# Patient Record
Sex: MALE | Race: WHITE | NOT HISPANIC OR LATINO | Employment: FULL TIME | URBAN - METROPOLITAN AREA
[De-identification: names, ages, dates, MRNs, and addresses within clinical notes are randomized per-mention and may not be internally consistent; named-entity substitution may affect disease eponyms.]

---

## 2018-06-20 ENCOUNTER — OFFICE VISIT (OUTPATIENT)
Dept: FAMILY MEDICINE | Facility: CLINIC | Age: 43
End: 2018-06-20

## 2018-06-20 VITALS
DIASTOLIC BLOOD PRESSURE: 82 MMHG | TEMPERATURE: 98.6 F | BODY MASS INDEX: 23.06 KG/M2 | OXYGEN SATURATION: 97 % | SYSTOLIC BLOOD PRESSURE: 124 MMHG | HEART RATE: 66 BPM | WEIGHT: 189.4 LBS | HEIGHT: 76 IN | RESPIRATION RATE: 16 BRPM

## 2018-06-20 DIAGNOSIS — F41.9 ANXIETY: ICD-10-CM

## 2018-06-20 DIAGNOSIS — Z23 NEED FOR VACCINATION: ICD-10-CM

## 2018-06-20 DIAGNOSIS — Z81.8 FAMILY HISTORY OF ANXIETY DISORDER: ICD-10-CM

## 2018-06-20 DIAGNOSIS — Z13.220 LIPID SCREENING: ICD-10-CM

## 2018-06-20 DIAGNOSIS — Z13.1 SCREENING FOR DIABETES MELLITUS: ICD-10-CM

## 2018-06-20 DIAGNOSIS — Z00.00 ROUTINE GENERAL MEDICAL EXAMINATION AT A HEALTH CARE FACILITY: Primary | ICD-10-CM

## 2018-06-20 DIAGNOSIS — Z87.442 HX OF RENAL CALCULI: ICD-10-CM

## 2018-06-20 DIAGNOSIS — G89.29 CHRONIC RIGHT SHOULDER PAIN: ICD-10-CM

## 2018-06-20 DIAGNOSIS — Z83.49 FAMILY HISTORY OF THYROID DISEASE IN MOTHER: ICD-10-CM

## 2018-06-20 DIAGNOSIS — Z23 NEED FOR HEPATITIS A IMMUNIZATION: ICD-10-CM

## 2018-06-20 DIAGNOSIS — Z23 NEED FOR TDAP VACCINATION: ICD-10-CM

## 2018-06-20 DIAGNOSIS — M25.511 CHRONIC RIGHT SHOULDER PAIN: ICD-10-CM

## 2018-06-20 LAB
% GRANULOCYTES: 51.9 % (ref 42.2–75.2)
HCT VFR BLD AUTO: 41.5 % (ref 39–51)
HEMOGLOBIN: 13.7 G/DL (ref 13.4–17.5)
LYMPHOCYTES NFR BLD AUTO: 38 % (ref 20.5–51.1)
MCH RBC QN AUTO: 29.3 PG (ref 27–31)
MCHC RBC AUTO-ENTMCNC: 33 G/DL (ref 33–37)
MCV RBC AUTO: 88.9 FL (ref 80–100)
MONOCYTES NFR BLD AUTO: 10.1 % (ref 1.7–9.3)
PLATELET # BLD AUTO: 209 K/UL (ref 140–450)
RBC # BLD AUTO: 4.67 X10/CMM (ref 4.2–5.9)
WBC # BLD AUTO: 3.4 X10/CMM (ref 3.8–11)

## 2018-06-20 PROCEDURE — 90632 HEPA VACCINE ADULT IM: CPT | Performed by: FAMILY MEDICINE

## 2018-06-20 PROCEDURE — 90471 IMMUNIZATION ADMIN: CPT | Performed by: FAMILY MEDICINE

## 2018-06-20 PROCEDURE — 90472 IMMUNIZATION ADMIN EACH ADD: CPT | Performed by: FAMILY MEDICINE

## 2018-06-20 PROCEDURE — 99213 OFFICE O/P EST LOW 20 MIN: CPT | Mod: 25 | Performed by: FAMILY MEDICINE

## 2018-06-20 PROCEDURE — 36415 COLL VENOUS BLD VENIPUNCTURE: CPT | Performed by: FAMILY MEDICINE

## 2018-06-20 PROCEDURE — 99396 PREV VISIT EST AGE 40-64: CPT | Mod: 25 | Performed by: FAMILY MEDICINE

## 2018-06-20 PROCEDURE — 85025 COMPLETE CBC W/AUTO DIFF WBC: CPT | Performed by: FAMILY MEDICINE

## 2018-06-20 PROCEDURE — 90715 TDAP VACCINE 7 YRS/> IM: CPT | Performed by: FAMILY MEDICINE

## 2018-06-20 RX ORDER — ESCITALOPRAM OXALATE 10 MG/1
10 TABLET ORAL DAILY
Qty: 30 TABLET | Refills: 1 | Status: SHIPPED | OUTPATIENT
Start: 2018-06-20 | End: 2018-08-16

## 2018-06-20 ASSESSMENT — PATIENT HEALTH QUESTIONNAIRE - PHQ9: 5. POOR APPETITE OR OVEREATING: MORE THAN HALF THE DAYS

## 2018-06-20 ASSESSMENT — ANXIETY QUESTIONNAIRES
3. WORRYING TOO MUCH ABOUT DIFFERENT THINGS: MORE THAN HALF THE DAYS
7. FEELING AFRAID AS IF SOMETHING AWFUL MIGHT HAPPEN: NOT AT ALL
5. BEING SO RESTLESS THAT IT IS HARD TO SIT STILL: NOT AT ALL
IF YOU CHECKED OFF ANY PROBLEMS ON THIS QUESTIONNAIRE, HOW DIFFICULT HAVE THESE PROBLEMS MADE IT FOR YOU TO DO YOUR WORK, TAKE CARE OF THINGS AT HOME, OR GET ALONG WITH OTHER PEOPLE: SOMEWHAT DIFFICULT
2. NOT BEING ABLE TO STOP OR CONTROL WORRYING: MORE THAN HALF THE DAYS
GAD7 TOTAL SCORE: 11
6. BECOMING EASILY ANNOYED OR IRRITABLE: MORE THAN HALF THE DAYS
1. FEELING NERVOUS, ANXIOUS, OR ON EDGE: NEARLY EVERY DAY

## 2018-06-20 NOTE — PATIENT INSTRUCTIONS
Consider Hep B vaccination at your convenience.       Preventive Health Recommendations  Male Ages 40 to 49    Yearly exam:             See your health care provider every year in order to  o   Review health changes.   o   Discuss preventive care.    o   Review your medicines if your doctor has prescribed any.    You should be tested each year for STDs (sexually transmitted diseases) if you re at risk.     Have a cholesterol test every 5 years.     Have a colonoscopy (test for colon cancer) if someone in your family has had colon cancer or polyps before age 50.     After age 45, have a diabetes test (fasting glucose). If you are at risk for diabetes, you should have this test every 3 years.      Talk with your health care provider about whether or not a prostate cancer screening test (PSA) is right for you.    Shots: Get a flu shot each year. Get a tetanus shot every 10 years.     Nutrition:    Eat at least 5 servings of fruits and vegetables daily.     Eat whole-grain bread, whole-wheat pasta and brown rice instead of white grains and rice.     Talk to your provider about Calcium and Vitamin D.     Lifestyle    Exercise for at least 150 minutes a week (30 minutes a day, 5 days a week). This will help you control your weight and prevent disease.     Limit alcohol to one drink per day.     No smoking.     Wear sunscreen to prevent skin cancer.     See your dentist every six months for an exam and cleaning.      Rotator Cuff Tear  The rotator cuff is a group of muscles and tendons that surround the shoulder joint. These muscles and tendons hold the arm in its joint. They also help the shoulder to rotate. The rotator cuff can be torn from overuse or injury. Gradual wear and tear can lead to inflammation of these tendons. This can progress to gradual or sudden tears.  Symptoms of a torn rotator cuff include:    Shoulder pain that gets worse when you raise your arm overhead    Weakness of the shoulder muscles with  overhead activity    Popping and clicking when you move your shoulder    Shoulder pain that wakes you up at night when sleeping on the hurt shoulder  Diagnosis is made by an MRI or arthroscopy. This is a surgical procedure to look inside the joint through a small tube. Partial rotator cuff tears can be treated by first resting, then strengthening the rotator cuff muscles.  Anti-inflammatory medicines, such as ibuprofen or naproxen, are useful. A limited number of steroid injections can be given. Surgery may be recommended for complete tears and partial tears that do not respond to medical treatment.  Home care    Avoid activities that make your pain worse. This includes overhead activities, doing the same motion over and over, and heavy lifting.    You may use over-the-counter pain medicines to control pain, unless another medicine was prescribed. If you have chronic liver or kidney disease or ever had a stomach ulcer or GI bleeding, talk with your healthcare provider before using these medicines.    If you were given a sling, use it for comfort. After your pain decreases, don t keep your arm in the sling all the time. Take your arm out several times a day and move the shoulder joint, as you are able.    Your healthcare provider may recommend gentle pendulum exercises. Stand or sit with your arm vertical and close to your side. Relax your shoulder muscles and gently swing the arm forward and back, side to side, and in small circles for about 5 minutes. Do this once or twice a day. There should be only slight pain with this exercise.    You may benefit from physical therapy or a home exercise program to strengthen your shoulder muscles. This will also increase your pain-free range of motion. Applying heat prior to exercises can help prepare the muscles and joint for activity. Talk to your healthcare provider about what is best for your condition.  Follow-up care  Follow up with your healthcare provider, or as  advised.  When to seek medical advice  Call your healthcare provider right away if any of the following occur:    Increasing shoulder pain    Rapid swelling in the involved shoulder or arm    Numbness, tingling, or pain radiating down the arm to the hand    Loss of strength in the affected arm  Date Last Reviewed: 11/23/2015 2000-2017 "[employer]. [address] [place] [address]. All rights reserved. This information is not intended as a substitute for professional medical care. Always follow your healthcare professional's instructions.        Understanding Rotator Cuff Tendonitis    Tendons are tough tissues that connect muscles to bone. A group of 4 muscles and their tendons form a  cuff  around the head of the upper arm bone. This is called the rotator cuff. It connects the upper arm to the shoulder blade. It gives the shoulder joint stability and strength.  If tendons are injured or strained, they may become irritated and swollen (inflamed). This is called tendonitis. Rotator cuff tendonitis may cause shoulder pain and loss of function.  What causes rotator cuff tendonitis?  Tendonitis results when the rotator cuff tendons are injured or overworked. The most common cause of injury is repetitive overhead activities. These can be work-related activities such as reaching, pushing, or lifting. Or they can be sports-related activities such as throwing, swimming, or lifting weights.  Symptoms of rotator cuff tendonitis  Pain on the side of the upper arm is the most common symptom. Pain may get worse with overhead movements or when you raise the arm above shoulder level. It may also hurt to lie on the shoulder at night.  Treatment for rotator cuff tendonitis  Treatment may include the following:    Active rest. This lets the rotator cuff heal. Active rest means using your arm and shoulder, but avoiding activities that cause pain, such as reaching overhead or sleeping on the shoulder.    Cold  packs. Putting ice packs on the shoulder helps reduce swelling and relieve pain.    Pain medicines. Prescription or over-the-counter pain medicines can help relieve pain and swelling.    Arm and shoulder exercises. These help keep the shoulder joint mobile as it heals. They also help improve the strength of muscles around the joint.  Possible complications  It might be tempting to stop using your shoulder completely to avoid pain. But doing so may lead to a condition called  frozen shoulder.  To help prevent this, following instructions you are given for active rest and for doing exercises to help your shoulder heal.  When to call your healthcare provider  Call your healthcare provider right away if you have any of these:    Fever of 100.4 F (38 C) or higher, or as directed    Symptoms that don t get better, or get worse    New symptoms   Date Last Reviewed: 3/10/2016    1009-2465 [employer]. [address] [place] [address], [place] [address]. All rights reserved. This information is not intended as a substitute for professional medical care. Always follow your healthcare professional's instructions.

## 2018-06-20 NOTE — LETTER
"[facility]  [address] [place]  [address]  Phone: [phone]    June 22, 2018      Chente Cerna  [address] [facility] [address] [facility] [address]              Dear Chente,    Your screening test for diabetes is negative. Your kidney function is excellent. Your thyroid level is in the normal range.   Your blood counts show a minimally diminished WBC. This is common in young healthy individuals and is really only a concern if you note you become ill frequently and take a long time to get over colds. We should simply recheck this the next time you have blood drawn to make sure it does not represent a trend.  Your HDL (\"good cholesterol\") is nice and high. A high HDL is considered protective of your cardiovascular system. Unfortunately, your LDL (\"bad cholesterol\") is also high. A high LDL is commonly associated with cardiovascular disease. Because you are young, we do not need to start a medication (statin) yet to control your cholesterol. This is an excellent time however, to eat right-sized portions of a heart healthy diet and increase your level of physical activity. We should plan to check your fasting cholesterol level annually.         Sincerely,     Katy \"Loyda\" MD Kee  Louise, CMA    Results for orders placed or performed in visit on 06/20/18   CBC with Diff/Plt ([facility])   Result Value Ref Range    WBC x10/cmm 3.4 (A) 3.8 - 11.0 x10/cmm    % Lymphocytes 38.0 20.5 - 51.1 %    % Monocytes 10.1 (A) 1.7 - 9.3 %    % Granulocytes 51.9 42.2 - 75.2 %    RBC x10/cmm 4.67 4.2 - 5.9 x10/cmm    Hemoglobin 13.7 13.4 - 17.5 g/dl    Hematocrit 41.5 39 - 51 %    MCV 88.9 80 - 100 fL    MCH 29.3 27.0 - 31.0 pg    MCHC 33.0 33.0 - 37.0 g/dL    Platelet Count 209 140 - 450 K/uL   Lipid Panel ([facility])   Result Value Ref Range    Cholesterol 252 (H) 100 - 199 mg/dL    Triglycerides 132 0 - 149 mg/dL    HDL Cholesterol 62 >39 mg/dL    VLDL Cholesterol [place] 26 5 - 40 mg/dL    LDL Cholesterol Calculated 164 (H) " 0 - 99 mg/dL    LDL/HDL Ratio 2.6 0.0 - 3.6 ratio    Narrative    Performed at:  01 - [facility] [address], [place], [place]  [id number]  : Pranay Reaves MD, Phone:  [phone]   Basic Metabolic Panel (8) ([facility])   Result Value Ref Range    Glucose 91 65 - 99 mg/dL    Urea Nitrogen 18 6 - 24 mg/dL    Creatinine 0.97 0.76 - 1.27 mg/dL    eGFR If NonAfricn Am 97 >59 mL/min/1.73    eGFR If Africn Am 112 >59 mL/min/1.73    BUN/Creatinine Ratio 19 9 - 20    Sodium 142 134 - 144 mmol/L    Potassium 4.4 3.5 - 5.2 mmol/L    Chloride 101 96 - 106 mmol/L    Total CO2 25 20 - 29 mmol/L    Calcium 9.5 8.7 - 10.2 mg/dL    Narrative    Performed at:  01 - [facility] [address], [place], [place]  [id number]  : Pranay Reaves MD, Phone:  [phone]   TSH ([facility])   Result Value Ref Range    TSH 0.907 0.450 - 4.500 uIU/mL    Narrative    Performed at:  01 - [facility] [address], [place], [place]  [id number]  : Pranay Reaves MD, Phone:  [phone]

## 2018-06-20 NOTE — MR AVS SNAPSHOT
After Visit Summary   6/20/2018    Chente Cerna    MRN: [id number]           Patient Information     Date Of Birth          1975        Visit Information        Provider Department      6/20/2018 7:45 AM Katy Sweet MD [facility]        Today's Diagnoses     Routine general medical examination at a health care facility    -  1    Lipid screening        Screening for diabetes mellitus        Hx of renal calculi        Need for Tdap vaccination        Need for vaccination        Chronic right shoulder pain        Need for hepatitis A immunization        Family history of thyroid disease in mother        Anxiety        Family history of anxiety disorder          Care Instructions    Consider Hep B vaccination at your convenience.       Preventive Health Recommendations  Male Ages 40 to 49    Yearly exam:             See your health care provider every year in order to  o   Review health changes.   o   Discuss preventive care.    o   Review your medicines if your doctor has prescribed any.    You should be tested each year for STDs (sexually transmitted diseases) if you re at risk.     Have a cholesterol test every 5 years.     Have a colonoscopy (test for colon cancer) if someone in your family has had colon cancer or polyps before age 50.     After age 45, have a diabetes test (fasting glucose). If you are at risk for diabetes, you should have this test every 3 years.      Talk with your health care provider about whether or not a prostate cancer screening test (PSA) is right for you.    Shots: Get a flu shot each year. Get a tetanus shot every 10 years.     Nutrition:    Eat at least 5 servings of fruits and vegetables daily.     Eat whole-grain bread, whole-wheat pasta and brown rice instead of white grains and rice.     Talk to your provider about Calcium and Vitamin D.     Lifestyle    Exercise for at least 150 minutes a week (30 minutes a day, 5 days a week). This  will help you control your weight and prevent disease.     Limit alcohol to one drink per day.     No smoking.     Wear sunscreen to prevent skin cancer.     See your dentist every six months for an exam and cleaning.      Rotator Cuff Tear  The rotator cuff is a group of muscles and tendons that surround the shoulder joint. These muscles and tendons hold the arm in its joint. They also help the shoulder to rotate. The rotator cuff can be torn from overuse or injury. Gradual wear and tear can lead to inflammation of these tendons. This can progress to gradual or sudden tears.  Symptoms of a torn rotator cuff include:    Shoulder pain that gets worse when you raise your arm overhead    Weakness of the shoulder muscles with overhead activity    Popping and clicking when you move your shoulder    Shoulder pain that wakes you up at night when sleeping on the hurt shoulder  Diagnosis is made by an MRI or arthroscopy. This is a surgical procedure to look inside the joint through a small tube. Partial rotator cuff tears can be treated by first resting, then strengthening the rotator cuff muscles.  Anti-inflammatory medicines, such as ibuprofen or naproxen, are useful. A limited number of steroid injections can be given. Surgery may be recommended for complete tears and partial tears that do not respond to medical treatment.  Home care    Avoid activities that make your pain worse. This includes overhead activities, doing the same motion over and over, and heavy lifting.    You may use over-the-counter pain medicines to control pain, unless another medicine was prescribed. If you have chronic liver or kidney disease or ever had a stomach ulcer or GI bleeding, talk with your healthcare provider before using these medicines.    If you were given a sling, use it for comfort. After your pain decreases, don t keep your arm in the sling all the time. Take your arm out several times a day and move the shoulder joint, as you are  able.    Your healthcare provider may recommend gentle pendulum exercises. Stand or sit with your arm vertical and close to your side. Relax your shoulder muscles and gently swing the arm forward and back, side to side, and in small circles for about 5 minutes. Do this once or twice a day. There should be only slight pain with this exercise.    You may benefit from physical therapy or a home exercise program to strengthen your shoulder muscles. This will also increase your pain-free range of motion. Applying heat prior to exercises can help prepare the muscles and joint for activity. Talk to your healthcare provider about what is best for your condition.  Follow-up care  Follow up with your healthcare provider, or as advised.  When to seek medical advice  Call your healthcare provider right away if any of the following occur:    Increasing shoulder pain    Rapid swelling in the involved shoulder or arm    Numbness, tingling, or pain radiating down the arm to the hand    Loss of strength in the affected arm  Date Last Reviewed: 11/23/2015 2000-2017 [employer] [address] [place], [place] [id number]. All rights reserved. This information is not intended as a substitute for professional medical care. Always follow your healthcare professional's instructions.        Understanding Rotator Cuff Tendonitis    Tendons are tough tissues that connect muscles to bone. A group of 4 muscles and their tendons form a  cuff  around the head of the upper arm bone. This is called the rotator cuff. It connects the upper arm to the shoulder blade. It gives the shoulder joint stability and strength.  If tendons are injured or strained, they may become irritated and swollen (inflamed). This is called tendonitis. Rotator cuff tendonitis may cause shoulder pain and loss of function.  What causes rotator cuff tendonitis?  Tendonitis results when the rotator cuff tendons are injured or overworked. The most common cause  of injury is repetitive overhead activities. These can be work-related activities such as reaching, pushing, or lifting. Or they can be sports-related activities such as throwing, swimming, or lifting weights.  Symptoms of rotator cuff tendonitis  Pain on the side of the upper arm is the most common symptom. Pain may get worse with overhead movements or when you raise the arm above shoulder level. It may also hurt to lie on the shoulder at night.  Treatment for rotator cuff tendonitis  Treatment may include the following:    Active rest. This lets the rotator cuff heal. Active rest means using your arm and shoulder, but avoiding activities that cause pain, such as reaching overhead or sleeping on the shoulder.    Cold packs. Putting ice packs on the shoulder helps reduce swelling and relieve pain.    Pain medicines. Prescription or over-the-counter pain medicines can help relieve pain and swelling.    Arm and shoulder exercises. These help keep the shoulder joint mobile as it heals. They also help improve the strength of muscles around the joint.  Possible complications  It might be tempting to stop using your shoulder completely to avoid pain. But doing so may lead to a condition called  frozen shoulder.  To help prevent this, following instructions you are given for active rest and for doing exercises to help your shoulder heal.  When to call your healthcare provider  Call your healthcare provider right away if you have any of these:    Fever of 100.4 F (38 C) or higher, or as directed    Symptoms that don t get better, or get worse    New symptoms   Date Last Reviewed: 3/10/2016    0747-4204 [employer]. [address] [place] [address], [place] [id number]. All rights reserved. This information is not intended as a substitute for professional medical care. Always follow your healthcare professional's instructions.                Follow-ups after your visit        Who to contact     If you have questions or  "need follow up information about today's clinic visit or your schedule please contact [facility] directly at [phone].  Normal or non-critical lab and imaging results will be communicated to you by [email or web address], letter or phone within 4 business days after the clinic has received the results. If you do not hear from us within 7 days, please contact the clinic through [email or web address] or phone. If you have a critical or abnormal lab result, we will notify you by phone as soon as possible.  Submit refill requests through "[employer] or call your pharmacy and they will forward the refill request to us. Please allow 3 business days for your refill to be completed.          Additional Information About Your Visit        [email or web address] Information     "[employer] lets you send messages to your doctor, view your test results, renew your prescriptions, schedule appointments and more. To sign up, go to [email or web address] . Click on \"Log in\" on the left side of the screen, which will take you to the Welcome page. Then click on \"Sign up Now\" on the right side of the page.     You will be asked to enter the access code listed below, as well as some personal information. Please follow the directions to create your username and password.     Your access code is: [email or web address]  Expires: 2018  8:36 AM     Your access code will  in 90 days. If you need help or a new code, please call your [place] clinic or [phone].        Care EveryWhere ID     This is your Care EveryWhere ID. This could be used by other organizations to access your [place] medical records  [id number]        Your Vitals Were     Pulse Temperature Respirations Height Pulse Oximetry BMI (Body Mass Index)    66 98.6  F (37  C) (Oral) 16 1.93 m (6' 4\") 97% 23.05 kg/m2       Blood Pressure from Last 3 Encounters:   18 124/82   16 118/78   07/10/15 126/69    Weight from Last 3 Encounters:   18 85.9 kg (189 lb 6.4 oz)   16 87.5 kg (193 " lb)   07/10/15 85.3 kg (188 lb)              We Performed the Following     ADMIN 1st VACCINE     Basic Metabolic Panel (8) ([facility])     CBC with Diff/Plt ([facility])     HEPATITIS A VACCINE (ADULT)     Lipid Panel ([facility])     TDAP VACCINE ([facility]) [61292]     TSH ([facility])          Today's Medication Changes          These changes are accurate as of 6/20/18  8:37 AM.  If you have any questions, ask your nurse or doctor.               Start taking these medicines.        Dose/Directions    escitalopram 10 MG tablet   Commonly known as:  LEXAPRO   Used for:  Anxiety, Family history of anxiety disorder   Started by:  Katy Sweet MD        Dose:  10 mg   Take 1 tablet (10 mg) by mouth daily   Quantity:  30 tablet   Refills:  1            Where to get your medicines      These medications were sent to [facility]/pharmacy #[id number] - [place], [place] - [address]  [address] [facility] [address]     Phone:  [phone]     escitalopram 10 MG tablet                Primary Care Provider Office Phone # Fax #    Guerrero Iqbal [phone] [address]  [facility] [address]        Equal Access to Services     LIS [facility] Care UnitENRIQUE AH: Hadii aad ku hadasho Soomaali, waaxda luqadaha, qaybta kaalmada adeegyada, waxay petra hayaustinn emigdio pizarro . So [facility] [phone].    ATENCIÓN: Si habla español, tiene a santizo disposición servicios gratuitos de asistencia lingüística. Llame al [phone].    We comply with applicable federal civil rights laws and [place] laws. We do not discriminate on the basis of race, color, national origin, age, disability, sex, sexual orientation, or gender identity.            Thank you!     Thank you for choosing [facility]  for your care. Our goal is always to provide you with excellent care. Hearing back from our patients is one way we can continue to improve our services. Please take a few minutes to complete the written survey that you may receive in  the mail after your visit with us. Thank you!             Your Updated Medication List - Protect others around you: Learn how to safely use, store and throw away your medicines at www.disposemymeds.org.          This list is accurate as of 6/20/18  8:37 AM.  Always use your most recent med list.                   Brand Name Dispense Instructions for use Diagnosis    albuterol 108 (90 Base) MCG/ACT Inhaler    PROAIR HFA/PROVENTIL HFA/VENTOLIN HFA    1 Inhaler    Inhale 2 puffs into the lungs every 6 hours as needed for shortness of breath / dyspnea or wheezing    Acute bronchitis       escitalopram 10 MG tablet    LEXAPRO    30 tablet    Take 1 tablet (10 mg) by mouth daily    Anxiety, Family history of anxiety disorder       ibuprofen 200 MG capsule      Take 200 mg by mouth every 4 hours as needed        TYLENOL PO       Throat pain
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
## 2018-06-21 LAB
BUN SERPL-MCNC: 18 MG/DL (ref 6–24)
BUN/CREATININE RATIO: 19 (ref 9–20)
CALCIUM SERPL-MCNC: 9.5 MG/DL (ref 8.7–10.2)
CHLORIDE SERPLBLD-SCNC: 101 MMOL/L (ref 96–106)
CHOLEST SERPL-MCNC: 252 MG/DL (ref 100–199)
CREAT SERPL-MCNC: 0.97 MG/DL (ref 0.76–1.27)
EGFR IF AFRICN AM: 112 ML/MIN/1.73
EGFR IF NONAFRICN AM: 97 ML/MIN/1.73
GLUCOSE SERPL-MCNC: 91 MG/DL (ref 65–99)
HDLC SERPL-MCNC: 62 MG/DL
LDL/HDL RATIO: 2.6 RATIO (ref 0–3.6)
LDLC SERPL CALC-MCNC: 164 MG/DL (ref 0–99)
POTASSIUM SERPL-SCNC: 4.4 MMOL/L (ref 3.5–5.2)
SODIUM SERPL-SCNC: 142 MMOL/L (ref 134–144)
TOTAL CO2: 25 MMOL/L (ref 20–29)
TRIGL SERPL-MCNC: 132 MG/DL (ref 0–149)
TSH BLD-ACNC: 0.91 UIU/ML (ref 0.45–4.5)
VLDLC SERPL CALC-MCNC: 26 MG/DL (ref 5–40)

## 2018-06-21 ASSESSMENT — ANXIETY QUESTIONNAIRES: GAD7 TOTAL SCORE: 11

## 2018-06-21 ASSESSMENT — PATIENT HEALTH QUESTIONNAIRE - PHQ9: SUM OF ALL RESPONSES TO PHQ QUESTIONS 1-9: 9

## 2018-08-16 ENCOUNTER — OFFICE VISIT (OUTPATIENT)
Dept: FAMILY MEDICINE | Facility: CLINIC | Age: 43
End: 2018-08-16

## 2018-08-16 VITALS
DIASTOLIC BLOOD PRESSURE: 62 MMHG | WEIGHT: 188 LBS | SYSTOLIC BLOOD PRESSURE: 118 MMHG | BODY MASS INDEX: 22.88 KG/M2 | HEART RATE: 74 BPM

## 2018-08-16 DIAGNOSIS — Z81.8 FAMILY HISTORY OF ANXIETY DISORDER: ICD-10-CM

## 2018-08-16 DIAGNOSIS — F41.9 ANXIETY: ICD-10-CM

## 2018-08-16 PROCEDURE — 99213 OFFICE O/P EST LOW 20 MIN: CPT | Performed by: FAMILY MEDICINE

## 2018-08-16 RX ORDER — ESCITALOPRAM OXALATE 10 MG/1
10-20 TABLET ORAL DAILY
Qty: 180 TABLET | Refills: 1 | Status: SHIPPED | OUTPATIENT
Start: 2018-08-16 | End: 2019-03-04

## 2018-08-16 ASSESSMENT — ANXIETY QUESTIONNAIRES
5. BEING SO RESTLESS THAT IT IS HARD TO SIT STILL: NOT AT ALL
6. BECOMING EASILY ANNOYED OR IRRITABLE: NOT AT ALL
7. FEELING AFRAID AS IF SOMETHING AWFUL MIGHT HAPPEN: NOT AT ALL
2. NOT BEING ABLE TO STOP OR CONTROL WORRYING: NOT AT ALL
3. WORRYING TOO MUCH ABOUT DIFFERENT THINGS: NOT AT ALL
GAD7 TOTAL SCORE: 2
1. FEELING NERVOUS, ANXIOUS, OR ON EDGE: SEVERAL DAYS
IF YOU CHECKED OFF ANY PROBLEMS ON THIS QUESTIONNAIRE, HOW DIFFICULT HAVE THESE PROBLEMS MADE IT FOR YOU TO DO YOUR WORK, TAKE CARE OF THINGS AT HOME, OR GET ALONG WITH OTHER PEOPLE: NOT DIFFICULT AT ALL

## 2018-08-16 ASSESSMENT — PATIENT HEALTH QUESTIONNAIRE - PHQ9: 5. POOR APPETITE OR OVEREATING: SEVERAL DAYS

## 2018-08-16 NOTE — MR AVS SNAPSHOT
"              After Visit Summary   2018    Chente Cerna    MRN: [id number]           Patient Information     Date Of Birth          1975        Visit Information        Provider Department      2018 7:45 AM Katy Sweet MD [facility]        Today's Diagnoses     Anxiety        Family history of anxiety disorder           Follow-ups after your visit        Who to contact     If you have questions or need follow up information about today's clinic visit or your schedule please contact [facility] directly at [phone].  Normal or non-critical lab and imaging results will be communicated to you by [employer], letter or phone within 4 business days after the clinic has received the results. If you do not hear from us within 7 days, please contact the clinic through [employer] or phone. If you have a critical or abnormal lab result, we will notify you by phone as soon as possible.  Submit refill requests through [employer] or call your pharmacy and they will forward the refill request to us. Please allow 3 business days for your refill to be completed.          Additional Information About Your Visit        MyChart Information     [employer] lets you send messages to your doctor, view your test results, renew your prescriptions, schedule appointments and more. To sign up, go to [email or web address] . Click on \"Log in\" on the left side of the screen, which will take you to the Welcome page. Then click on \"Sign up Now\" on the right side of the page.     You will be asked to enter the access code listed below, as well as some personal information. Please follow the directions to create your username and password.     Your access code is: [email or web address]  Expires: 2018  8:36 AM     Your access code will  in 90 days. If you need help or a new code, please call your [place] clinic or [phone].        Care EveryWhere ID     This is your Care EveryWhere ID. This " could be used by other organizations to access your [place] medical records  [id number]        Your Vitals Were     Pulse BMI (Body Mass Index)                74 22.88 kg/m2           Blood Pressure from Last 3 Encounters:   08/16/18 118/62   06/20/18 124/82   04/04/16 118/78    Weight from Last 3 Encounters:   08/16/18 85.3 kg (188 lb)   06/20/18 85.9 kg (189 lb 6.4 oz)   04/04/16 87.5 kg (193 lb)              Today, you had the following     No orders found for display         Today's Medication Changes          These changes are accurate as of 8/16/18  8:10 AM.  If you have any questions, ask your nurse or doctor.               These medicines have changed or have updated prescriptions.        Dose/Directions    escitalopram 10 MG tablet   Commonly known as:  LEXAPRO   This may have changed:  how much to take   Used for:  Anxiety, Family history of anxiety disorder   Changed by:  Katy Sweet MD        Dose:  10-20 mg   Take 1-2 tablets (10-20 mg) by mouth daily   Quantity:  180 tablet   Refills:  1            Where to get your medicines      These medications were sent to [facility]/pharmacy #[id number] - [place], [place] - [address]  [address] [address]     Phone:  [phone]     escitalopram 10 MG tablet                Primary Care Provider Office Phone # Fax #    Guerrero Iqbal [phone] [address] [address]        Equal Access to Services     [facility] PavilionENRIQUE AH: Hadii trip nolascoo Sochaparrita, waaxda luqadaha, qaybta kaalmada adeegyada, waxmatheus petra pizarro . So [facility] [phone].    ATENCIÓN: Si habla español, tiene a santizo disposición servicios gratuitos de asistencia lingüística. Llame al [phone].    We comply with applicable federal civil rights laws and [place] laws. We do not discriminate on the basis of race, color, national origin, age, disability, sex, sexual orientation, or gender identity.            Thank you!      Thank you for choosing [facility]  for your care. Our goal is always to provide you with excellent care. Hearing back from our patients is one way we can continue to improve our services. Please take a few minutes to complete the written survey that you may receive in the mail after your visit with us. Thank you!             Your Updated Medication List - Protect others around you: Learn how to safely use, store and throw away your medicines at www.disposemymeds.org.          This list is accurate as of 8/16/18  8:10 AM.  Always use your most recent med list.                   Brand Name Dispense Instructions for use Diagnosis    albuterol 108 (90 Base) MCG/ACT inhaler    PROAIR HFA/PROVENTIL HFA/VENTOLIN HFA    1 Inhaler    Inhale 2 puffs into the lungs every 6 hours as needed for shortness of breath / dyspnea or wheezing    Acute bronchitis       escitalopram 10 MG tablet    LEXAPRO    180 tablet    Take 1-2 tablets (10-20 mg) by mouth daily    Anxiety, Family history of anxiety disorder       ibuprofen 200 MG capsule      Take 200 mg by mouth every 4 hours as needed        TYLENOL PO       Throat pain

## 2018-08-17 ASSESSMENT — ANXIETY QUESTIONNAIRES: GAD7 TOTAL SCORE: 2

## 2018-08-17 ASSESSMENT — PATIENT HEALTH QUESTIONNAIRE - PHQ9: SUM OF ALL RESPONSES TO PHQ QUESTIONS 1-9: 1

## 2019-03-04 ENCOUNTER — OFFICE VISIT (OUTPATIENT)
Dept: FAMILY MEDICINE | Facility: CLINIC | Age: 44
End: 2019-03-04

## 2019-03-04 VITALS
TEMPERATURE: 98.7 F | RESPIRATION RATE: 16 BRPM | HEART RATE: 57 BPM | OXYGEN SATURATION: 98 % | DIASTOLIC BLOOD PRESSURE: 86 MMHG | BODY MASS INDEX: 23.74 KG/M2 | SYSTOLIC BLOOD PRESSURE: 140 MMHG | WEIGHT: 195 LBS

## 2019-03-04 DIAGNOSIS — F41.9 ANXIETY: ICD-10-CM

## 2019-03-04 DIAGNOSIS — B97.89 VIRAL RESPIRATORY ILLNESS: Primary | ICD-10-CM

## 2019-03-04 DIAGNOSIS — Z81.8 FAMILY HISTORY OF ANXIETY DISORDER: ICD-10-CM

## 2019-03-04 DIAGNOSIS — J98.8 VIRAL RESPIRATORY ILLNESS: Primary | ICD-10-CM

## 2019-03-04 DIAGNOSIS — Z87.891 FORMER SMOKER: ICD-10-CM

## 2019-03-04 PROCEDURE — 99214 OFFICE O/P EST MOD 30 MIN: CPT | Performed by: FAMILY MEDICINE

## 2019-03-04 RX ORDER — ESCITALOPRAM OXALATE 10 MG/1
15 TABLET ORAL DAILY
Qty: 135 TABLET | Refills: 3 | Status: SHIPPED | OUTPATIENT
Start: 2019-03-04 | End: 2020-03-19

## 2019-03-04 NOTE — PROGRESS NOTES
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 Fever no, mildly tired.   Daughter OM one week ago. Strep negative.  Former smoker a while back  Travel January [place]  He had asthma and \"outgrown\"    Severity: moderate    Accompanying signs and symptoms: See Above    History (predisposing factors):  asthma    Precipitating or alleviating factors: Musinex    Therapies tried and outcome:  rest and fluids oral decongestant acetaminophen        Problem list and histories reviewed & adjusted, as indicated.  Additional history: as documented    Patient Active Problem List   Diagnosis     Kidney stone     Health Care Home     Anxiety     Past Surgical History:   Procedure Laterality Date     GENITOURINARY SURGERY  vasectomy       Social History     Tobacco Use     Smoking status: Former Smoker     Smokeless tobacco: Never Used     Tobacco comment: Quit at age 23 yo, periodic use until age 26   Substance Use Topics     Alcohol use: Yes     Alcohol/week: 1.5 oz     Types: 3 Cans of beer per week     Comment: ocassional     Family History   Problem Relation Age of Onset     Anxiety Disorder Mother         Hx of Zoloft, now on Lexapro     Thyroid Disease Mother      Heart Defect Father         Replaced heart valve     Anxiety Disorder Brother         On Lexapro     Melanoma Brother      Hypertension Maternal Grandfather      Asthma Paternal Grandfather          Current Outpatient Medications   Medication Sig Dispense Refill     Acetaminophen (TYLENOL PO)        albuterol (PROAIR HFA, PROVENTIL HFA, VENTOLIN HFA) 108 (90 BASE) MCG/ACT inhaler Inhale 2 puffs into the lungs every 6 hours as needed for shortness of breath / dyspnea or wheezing 1 " Inhaler 0     escitalopram (LEXAPRO) 10 MG tablet Take 1-2 tablets (10-20 mg) by mouth daily 180 tablet 1     ibuprofen 200 MG capsule Take 200 mg by mouth every 4 hours as needed       Allergies   Allergen Reactions     Nuts Anaphylaxis and Swelling     All nuts       Amoxicillin      No Clinical Screening - See Comments      Blue chees     Penicillins      Red Wine Complex [Red Wine Powder]      Mucous & congestion     Recent Labs   Lab Test 06/20/18  0936   *   HDL 62   TRIG 132   CR 0.97   POTASSIUM 4.4      BP Readings from Last 3 Encounters:   03/04/19 140/86   08/16/18 118/62   06/20/18 124/82    Wt Readings from Last 3 Encounters:   03/04/19 88.5 kg (195 lb)   08/16/18 85.3 kg (188 lb)   06/20/18 85.9 kg (189 lb 6.4 oz)          Lexapro needs refill 1.5 of a 10 mg tab daily.  Doing family group [facility] , but thinking about adding individual  PHQ=0  LUCIAN =1    Brother with ALS age 39  Job-         Labs reviewed in EPIC    Reviewed and updated as needed this visit by clinical staff       Reviewed and updated as needed this visit by Provider         ROS:  Constitutional, HEENT, cardiovascular, pulmonary, GI, , musculoskeletal, neuro, skin, endocrine and psych systems are negative, except as otherwise noted.    OBJECTIVE:     /86   Pulse 57   Temp 98.7  F (37.1  C) (Oral)   Resp 16   Wt 88.5 kg (195 lb)   SpO2 98%   BMI 23.74 kg/m    There is no height or weight on file to calculate BMI.  GENERAL: healthy, alert and no distress  EYES: Eyes grossly normal to inspection, PERRL and conjunctivae and sclerae normal  HENT: ear canals and TM's normal, nose and mouth without ulcers or lesions  NECK: no adenopathy, no asymmetry, masses, or scars and thyroid normal to palpation  RESP: lungs clear to auscultation - no rales, rhonchi or wheezes  CV: regular rate and rhythm, normal S1 S2, no S3 or S4, no murmur, click or rub, no peripheral edema and peripheral pulses  strong  ABDOMEN: soft, nontender, no hepatosplenomegaly, no masses and bowel sounds normal  MS: no gross musculoskeletal defects noted, no edema  SKIN: no suspicious lesions or rashes  NEURO: Normal strength and tone, mentation intact and speech normal  PSYCH: mentation appears normal, affect normal/bright mild anxiety  LYMPH: no cervical, supraclavicular, axillary, or inguinal adenopathy    Diagnostic Test Results:  Results for orders placed or performed in visit on 06/20/18   CBC with Diff/Plt ([facility])   Result Value Ref Range    WBC x10/cmm 3.4 (A) 3.8 - 11.0 x10/cmm    % Lymphocytes 38.0 20.5 - 51.1 %    % Monocytes 10.1 (A) 1.7 - 9.3 %    % Granulocytes 51.9 42.2 - 75.2 %    RBC x10/cmm 4.67 4.2 - 5.9 x10/cmm    Hemoglobin 13.7 13.4 - 17.5 g/dl    Hematocrit 41.5 39 - 51 %    MCV 88.9 80 - 100 fL    MCH 29.3 27.0 - 31.0 pg    MCHC 33.0 33.0 - 37.0 g/dL    Platelet Count 209 140 - 450 K/uL   Lipid Panel ([facility])   Result Value Ref Range    Cholesterol 252 (H) 100 - 199 mg/dL    Triglycerides 132 0 - 149 mg/dL    HDL Cholesterol 62 >39 mg/dL    VLDL Cholesterol [place] 26 5 - 40 mg/dL    LDL Cholesterol Calculated 164 (H) 0 - 99 mg/dL    LDL/HDL Ratio 2.6 0.0 - 3.6 ratio    Narrative    Performed at:  01 - [facility] [address], [place], [place]  [id number]  : Pranay Reaves MD, Phone:  [phone]   Basic Metabolic Panel (8) ([facility])   Result Value Ref Range    Glucose 91 65 - 99 mg/dL    Urea Nitrogen 18 6 - 24 mg/dL    Creatinine 0.97 0.76 - 1.27 mg/dL    eGFR If NonAfricn Am 97 >59 mL/min/1.73    eGFR If Africn Am 112 >59 mL/min/1.73    BUN/Creatinine Ratio 19 9 - 20    Sodium 142 134 - 144 mmol/L    Potassium 4.4 3.5 - 5.2 mmol/L    Chloride 101 96 - 106 mmol/L    Total CO2 25 20 - 29 mmol/L    Calcium 9.5 8.7 - 10.2 mg/dL    Narrative    Performed at:  01 - [facility] [address], [place], [place]  [id number]  : Pranay Reaves MD, Phone:  [phone]   TSH ([facility])   Result  Value Ref Range    TSH 0.907 0.450 - 4.500 uIU/mL    Narrative    Performed at:  01 - [facility] [address], [place], [place]  [id number]  : Pranay Reaves MD, Phone:  [phone]       ASSESSMENT/PLAN:     ASSESSMENT / PLAN:  (J98.8,  B97.89) Viral respiratory illness  (primary encounter diagnosis)  Comment:   Plan: OTC cares    (F41.9) Anxiety  Comment: using 1.5 tabs daily  Plan: escitalopram (LEXAPRO) 10 MG tablet            (Z81.8) Family history of anxiety disorder  Comment:   Plan: escitalopram (LEXAPRO) 10 MG tablet            (Z87.891) Former smoker  Comment:   Plan: continue not smoking          Jolynn Daniels MD  [facility]

## 2020-03-19 ENCOUNTER — TELEPHONE (OUTPATIENT)
Dept: FAMILY MEDICINE | Facility: CLINIC | Age: 45
End: 2020-03-19

## 2020-03-19 DIAGNOSIS — Z81.8 FAMILY HISTORY OF ANXIETY DISORDER: ICD-10-CM

## 2020-03-19 DIAGNOSIS — F41.9 ANXIETY: ICD-10-CM

## 2020-03-19 RX ORDER — ESCITALOPRAM OXALATE 10 MG/1
15 TABLET ORAL DAILY
Qty: 135 TABLET | Refills: 0 | Status: SHIPPED | OUTPATIENT
Start: 2020-03-19 | End: 2020-04-26

## 2020-03-19 ASSESSMENT — PATIENT HEALTH QUESTIONNAIRE - PHQ9: SUM OF ALL RESPONSES TO PHQ QUESTIONS 1-9: 2

## 2020-03-19 NOTE — TELEPHONE ENCOUNTER
Patient called requesting refill Lexapro. Patient last OV 3/4/19-he is aware that he is due to be seen but wishes to delay CPX until COVID19 Pandemic is better controlled. Per Dr. Iqbal 3 month supply prescription sent to pharmacy. PHQ-9 done via phone. Hermelinda Fallon

## 2020-04-26 DIAGNOSIS — Z81.8 FAMILY HISTORY OF ANXIETY DISORDER: ICD-10-CM

## 2020-04-26 DIAGNOSIS — F41.9 ANXIETY: ICD-10-CM

## 2020-04-26 RX ORDER — ESCITALOPRAM OXALATE 10 MG/1
TABLET ORAL
Qty: 135 TABLET | Refills: 0 | Status: SHIPPED | OUTPATIENT
Start: 2020-04-26 | End: 2020-07-21

## 2020-06-22 ENCOUNTER — VIRTUAL VISIT (OUTPATIENT)
Dept: FAMILY MEDICINE | Facility: CLINIC | Age: 45
End: 2020-06-22

## 2020-06-22 DIAGNOSIS — R51.9 ACUTE NONINTRACTABLE HEADACHE, UNSPECIFIED HEADACHE TYPE: ICD-10-CM

## 2020-06-22 DIAGNOSIS — R50.9 FEVER AND CHILLS: Primary | ICD-10-CM

## 2020-06-22 DIAGNOSIS — R21 RASH: ICD-10-CM

## 2020-06-22 PROCEDURE — 99213 OFFICE O/P EST LOW 20 MIN: CPT | Mod: 95 | Performed by: FAMILY MEDICINE

## 2020-06-22 NOTE — PROGRESS NOTES
Reason for visit: Ill with what he thinks are COVID -19 symptoms since 6/17. Chills and fevers up to 103 - normal today. Cough.   Onset today of rash all over chest.   Was tested at [facility] for COVID on 6/18 but result not available yet. Concerned for family and self. Wife and kids are asymptomatic but kids getting tested today at pediatrician.       Telephone-Visit Details    Type of service:  Telephone    Video Start Time (time video started): 1645    Video End Time (time video stopped): 1702    Originating Location (pt. Location): Home    Distant Location (provider location):  [facility]     Mode of Communication:  Video Conference via Telephone    Physician has received verbal consent for a Telephone Visit from the patient? Yes      Vishal Edmonds MD    SUBJECTIVE:                                                    Chente Cerna is a 44 year old male with 5 days of sx.  Unable to get video working so did telephone visit.  Chills and some aches.  Fever developed.  Tmax 103.  Also some intermittent headache.  Recently broke out in red rash on trunk and arms.  It is asymptomatic.  Fever has improved.  Had testing at [facility] the day after symptoms started, 4 days ago.  No results back yet.  No CP, cough, SOB or other symptoms.  He is feeling ok otherwise.      Problem list, Medication list, Allergies, and Medical/Social/Surgical histories reviewed in [facility] and updated as appropriate.   Additional history: as documented    ROS:    A 7 system review was completed and is as noted in HPI and otherwise negative.      Histories:   Patient Active Problem List   Diagnosis     Kidney stone     Health Care Home     Anxiety     Past Surgical History:   Procedure Laterality Date     GENITOURINARY SURGERY  vasectomy       Social History     Tobacco Use     Smoking status: Former Smoker     Smokeless tobacco: Never Used     Tobacco comment: Quit at age 21 yo, periodic use until age 26   Substance Use Topics     Alcohol  use: Yes     Alcohol/week: 2.5 standard drinks     Types: 3 Cans of beer per week     Comment: ocassional     Family History   Problem Relation Age of Onset     Anxiety Disorder Mother         Hx of Zoloft, now on Lexapro     Thyroid Disease Mother      Heart Defect Father         Replaced heart valve     Anxiety Disorder Brother         On Lexapro     Melanoma Brother      Hypertension Maternal Grandfather      Asthma Paternal Grandfather            OBJECTIVE:                                                    There were no vitals taken for this visit.  There is no height or weight on file to calculate BMI.     General: sounds well, in NAD.         ASSESSMENT/PLAN:                                                      Discussed in detail.  At this time he sounds well and is in no distress.  Cont home quarantine.  As he was tested only 24 hours after symptom onset and no results back will arrange for repeat testing, not only to see if results can come back sooner but also concern for false neg from previous testing.  Cont symptomatic cares.  Reasons to go to ED discussed and understood.    Fever and chills  -     Symptomatic COVID-19 Virus (Coronavirus) by PCR; Future    Rash  -     Symptomatic COVID-19 Virus (Coronavirus) by PCR; Future    Acute nonintractable headache, unspecified headache type        Vishal Edmonds MD  [facility]

## 2020-06-24 DIAGNOSIS — R21 RASH: ICD-10-CM

## 2020-06-24 DIAGNOSIS — R50.9 FEVER AND CHILLS: ICD-10-CM

## 2020-06-24 PROCEDURE — U0003 INFECTIOUS AGENT DETECTION BY NUCLEIC ACID (DNA OR RNA); SEVERE ACUTE RESPIRATORY SYNDROME CORONAVIRUS 2 (SARS-COV-2) (CORONAVIRUS DISEASE [COVID-19]), AMPLIFIED PROBE TECHNIQUE, MAKING USE OF HIGH THROUGHPUT TECHNOLOGIES AS DESCRIBED BY CMS-2020-01-R: HCPCS | Performed by: FAMILY MEDICINE

## 2020-06-24 NOTE — LETTER
June 27, 2020        Chente DALE Eryn  [address] [id number]    This letter provides a written record that you were tested for COVID-19 on  6/25/20.       Your result was negative. This means that we didn t find the virus that causes COVID-19 in your sample. A test may show negative when you do actually have the virus. This can happen when the virus is in the early stages of infection, before you feel illness symptoms.    If you have symptoms   Stay home and away from others (self-isolate) until you meet ALL of the guidelines below:    You ve had no fever--and no medicine that reduces fever--for 3 full days (72 hours). And      Your other symptoms have gotten better. For example, your cough or breathing has improved. And     At least 10 days have passed since your symptoms started.    During this time:    Stay home. Don t go to work, school or anywhere else.     Stay in your own room, including for meals. Use your own bathroom if you can.    Stay away from others in your home. No hugging, kissing or shaking hands. No visitors.    Clean  high touch  surfaces often (doorknobs, counters, handles, etc.). Use a household cleaning spray or wipes. You can find a full list on the EPA website at www.epa.gov/pesticide-registration/list-n-disinfectants-use-against-sars-cov-2.    Cover your mouth and nose with a mask, tissue or washcloth to avoid spreading germs.    Wash your hands and face often with soap and water.    Going back to work  Check with your employer for any guidelines to follow for going back to work.    Employers: This document serves as formal notice that your employee tested negative for COVID-19, as of the testing date shown above.

## 2020-06-25 LAB
SARS-COV-2 RNA SPEC QL NAA+PROBE: NOT DETECTED
SPECIMEN SOURCE: NORMAL

## 2020-07-05 ENCOUNTER — OFFICE VISIT (OUTPATIENT)
Dept: URGENT CARE | Facility: URGENT CARE | Age: 45
End: 2020-07-05
Payer: COMMERCIAL

## 2020-07-05 ENCOUNTER — HOSPITAL ENCOUNTER (INPATIENT)
Facility: CLINIC | Age: 45
LOS: 4 days | Discharge: HOME | DRG: 868 | End: 2020-07-09
Attending: EMERGENCY MEDICINE | Admitting: INTERNAL MEDICINE
Payer: COMMERCIAL

## 2020-07-05 VITALS
DIASTOLIC BLOOD PRESSURE: 80 MMHG | RESPIRATION RATE: 14 BRPM | BODY MASS INDEX: 23.62 KG/M2 | TEMPERATURE: 99.7 F | HEIGHT: 76 IN | SYSTOLIC BLOOD PRESSURE: 124 MMHG | WEIGHT: 194 LBS | HEART RATE: 36 BPM

## 2020-07-05 DIAGNOSIS — R53.83 OTHER FATIGUE: ICD-10-CM

## 2020-07-05 DIAGNOSIS — I21.3 ST ELEVATION MI (STEMI) (H): ICD-10-CM

## 2020-07-05 DIAGNOSIS — I44.2 COMPLETE HEART BLOCK (H): ICD-10-CM

## 2020-07-05 DIAGNOSIS — I44.2 THIRD DEGREE HEART BLOCK BY ELECTROCARDIOGRAM (H): Primary | ICD-10-CM

## 2020-07-05 DIAGNOSIS — A69.20 LYME DISEASE: ICD-10-CM

## 2020-07-05 DIAGNOSIS — R00.1 BRADYCARDIA: ICD-10-CM

## 2020-07-05 LAB
ALBUMIN SERPL-MCNC: 3.4 G/DL (ref 3.4–5)
ALP SERPL-CCNC: 80 U/L (ref 40–150)
ALT SERPL W P-5'-P-CCNC: 34 U/L (ref 0–70)
ANION GAP SERPL CALCULATED.3IONS-SCNC: 4 MMOL/L (ref 3–14)
AST SERPL W P-5'-P-CCNC: 18 U/L (ref 0–45)
BASOPHILS # BLD AUTO: 0.1 10E9/L (ref 0–0.2)
BASOPHILS NFR BLD AUTO: 0.9 %
BILIRUB SERPL-MCNC: 0.4 MG/DL (ref 0.2–1.3)
BUN SERPL-MCNC: 23 MG/DL (ref 7–30)
CALCIUM SERPL-MCNC: 9.1 MG/DL (ref 8.5–10.1)
CHLORIDE SERPL-SCNC: 106 MMOL/L (ref 94–109)
CO2 SERPL-SCNC: 28 MMOL/L (ref 20–32)
CREAT SERPL-MCNC: 1.05 MG/DL (ref 0.66–1.25)
DIFFERENTIAL METHOD BLD: ABNORMAL
EOSINOPHIL # BLD AUTO: 0.1 10E9/L (ref 0–0.7)
EOSINOPHIL NFR BLD AUTO: 1.9 %
ERYTHROCYTE [DISTWIDTH] IN BLOOD BY AUTOMATED COUNT: 12.5 % (ref 10–15)
GFR SERPL CREATININE-BSD FRML MDRD: 86 ML/MIN/{1.73_M2}
GLUCOSE SERPL-MCNC: 94 MG/DL (ref 70–99)
HCT VFR BLD AUTO: 39.3 % (ref 40–53)
HGB BLD-MCNC: 12.2 G/DL (ref 13.3–17.7)
IMM GRANULOCYTES # BLD: 0 10E9/L (ref 0–0.4)
IMM GRANULOCYTES NFR BLD: 0.1 %
LYMPHOCYTES # BLD AUTO: 2.1 10E9/L (ref 0.8–5.3)
LYMPHOCYTES NFR BLD AUTO: 30.8 %
MCH RBC QN AUTO: 29 PG (ref 26.5–33)
MCHC RBC AUTO-ENTMCNC: 31 G/DL (ref 31.5–36.5)
MCV RBC AUTO: 93 FL (ref 78–100)
MONOCYTES # BLD AUTO: 1 10E9/L (ref 0–1.3)
MONOCYTES NFR BLD AUTO: 14.7 %
NEUTROPHILS # BLD AUTO: 3.5 10E9/L (ref 1.6–8.3)
NEUTROPHILS NFR BLD AUTO: 51.6 %
NRBC # BLD AUTO: 0 10*3/UL
NRBC BLD AUTO-RTO: 0 /100
PLATELET # BLD AUTO: 344 10E9/L (ref 150–450)
POTASSIUM SERPL-SCNC: 4.4 MMOL/L (ref 3.4–5.3)
PROT SERPL-MCNC: 7.6 G/DL (ref 6.8–8.8)
RBC # BLD AUTO: 4.21 10E12/L (ref 4.4–5.9)
SODIUM SERPL-SCNC: 138 MMOL/L (ref 133–144)
TROPONIN I SERPL-MCNC: <0.015 UG/L (ref 0–0.04)
WBC # BLD AUTO: 6.7 10E9/L (ref 4–11)

## 2020-07-05 PROCEDURE — 85025 COMPLETE CBC W/AUTO DIFF WBC: CPT | Performed by: EMERGENCY MEDICINE

## 2020-07-05 PROCEDURE — 27210794 ZZH OR GENERAL SUPPLY STERILE: Performed by: INTERNAL MEDICINE

## 2020-07-05 PROCEDURE — 02HK3JZ INSERTION OF PACEMAKER LEAD INTO RIGHT VENTRICLE, PERCUTANEOUS APPROACH: ICD-10-PCS | Performed by: INTERNAL MEDICINE

## 2020-07-05 PROCEDURE — 96365 THER/PROPH/DIAG IV INF INIT: CPT | Performed by: EMERGENCY MEDICINE

## 2020-07-05 PROCEDURE — 33210 INSERT ELECTRD/PM CATH SNGL: CPT | Performed by: INTERNAL MEDICINE

## 2020-07-05 PROCEDURE — 99285 EMERGENCY DEPT VISIT HI MDM: CPT | Mod: 25 | Performed by: EMERGENCY MEDICINE

## 2020-07-05 PROCEDURE — 33210 INSERT ELECTRD/PM CATH SNGL: CPT | Mod: GC | Performed by: INTERNAL MEDICINE

## 2020-07-05 PROCEDURE — 93005 ELECTROCARDIOGRAM TRACING: CPT | Performed by: EMERGENCY MEDICINE

## 2020-07-05 PROCEDURE — 86618 LYME DISEASE ANTIBODY: CPT | Performed by: EMERGENCY MEDICINE

## 2020-07-05 PROCEDURE — 87798 DETECT AGENT NOS DNA AMP: CPT | Performed by: EMERGENCY MEDICINE

## 2020-07-05 PROCEDURE — 99207 ZZC OFFICE-HOSPITAL ADMIT: CPT | Performed by: NURSE PRACTITIONER

## 2020-07-05 PROCEDURE — 21400000 ZZH R&B CCU UMMC

## 2020-07-05 PROCEDURE — C1894 INTRO/SHEATH, NON-LASER: HCPCS | Performed by: INTERNAL MEDICINE

## 2020-07-05 PROCEDURE — 5A1223Z PERFORMANCE OF CARDIAC PACING, CONTINUOUS: ICD-10-PCS | Performed by: INTERNAL MEDICINE

## 2020-07-05 PROCEDURE — 80053 COMPREHEN METABOLIC PANEL: CPT | Performed by: EMERGENCY MEDICINE

## 2020-07-05 PROCEDURE — 99222 1ST HOSP IP/OBS MODERATE 55: CPT | Mod: 25 | Performed by: INTERNAL MEDICINE

## 2020-07-05 PROCEDURE — 86617 LYME DISEASE ANTIBODY: CPT | Performed by: EMERGENCY MEDICINE

## 2020-07-05 PROCEDURE — 93010 ELECTROCARDIOGRAM REPORT: CPT | Mod: Z6 | Performed by: EMERGENCY MEDICINE

## 2020-07-05 PROCEDURE — 25000125 ZZHC RX 250: Performed by: INTERNAL MEDICINE

## 2020-07-05 PROCEDURE — C1898 LEAD, PMKR, OTHER THAN TRANS: HCPCS | Performed by: INTERNAL MEDICINE

## 2020-07-05 PROCEDURE — 99223 1ST HOSP IP/OBS HIGH 75: CPT | Mod: 25 | Performed by: INTERNAL MEDICINE

## 2020-07-05 PROCEDURE — 93000 ELECTROCARDIOGRAM COMPLETE: CPT | Mod: 59 | Performed by: NURSE PRACTITIONER

## 2020-07-05 PROCEDURE — 25000125 ZZHC RX 250: Performed by: EMERGENCY MEDICINE

## 2020-07-05 PROCEDURE — 84484 ASSAY OF TROPONIN QUANT: CPT | Performed by: EMERGENCY MEDICINE

## 2020-07-05 PROCEDURE — 86753 PROTOZOA ANTIBODY NOS: CPT | Performed by: EMERGENCY MEDICINE

## 2020-07-05 DEVICE — IMP LEAD PACING BIPOLAR CAPSUREFIX NOVUS 58CM 5076-58: Type: IMPLANTABLE DEVICE | Status: FUNCTIONAL

## 2020-07-05 RX ORDER — LIDOCAINE 40 MG/G
CREAM TOPICAL
Status: DISCONTINUED | OUTPATIENT
Start: 2020-07-05 | End: 2020-07-09 | Stop reason: HOSPADM

## 2020-07-05 RX ORDER — ACETAMINOPHEN 650 MG/1
650 SUPPOSITORY RECTAL EVERY 4 HOURS PRN
Status: DISCONTINUED | OUTPATIENT
Start: 2020-07-05 | End: 2020-07-09 | Stop reason: HOSPADM

## 2020-07-05 RX ORDER — ACETAMINOPHEN 325 MG/1
650 TABLET ORAL EVERY 4 HOURS PRN
Status: DISCONTINUED | OUTPATIENT
Start: 2020-07-05 | End: 2020-07-09 | Stop reason: HOSPADM

## 2020-07-05 RX ORDER — DOXYCYCLINE 100 MG/10ML
100 INJECTION, POWDER, LYOPHILIZED, FOR SOLUTION INTRAVENOUS EVERY 12 HOURS
Status: DISCONTINUED | OUTPATIENT
Start: 2020-07-05 | End: 2020-07-07

## 2020-07-05 RX ADMIN — DOXYCYCLINE 100 MG: 100 INJECTION, POWDER, LYOPHILIZED, FOR SOLUTION INTRAVENOUS at 12:59

## 2020-07-05 RX ADMIN — DOXYCYCLINE 100 MG: 100 INJECTION, POWDER, LYOPHILIZED, FOR SOLUTION INTRAVENOUS at 23:35

## 2020-07-05 ASSESSMENT — ACTIVITIES OF DAILY LIVING (ADL)
SWALLOWING: 0-->SWALLOWS FOODS/LIQUIDS WITHOUT DIFFICULTY
ADLS_ACUITY_SCORE: 11
RETIRED_EATING: 0-->INDEPENDENT
RETIRED_COMMUNICATION: 0-->UNDERSTANDS/COMMUNICATES WITHOUT DIFFICULTY
COGNITION: 0 - NO COGNITION ISSUES REPORTED
FALL_HISTORY_WITHIN_LAST_SIX_MONTHS: NO
TOILETING: 0-->INDEPENDENT
BATHING: 0-->INDEPENDENT
ADLS_ACUITY_SCORE: 13
DRESS: 0-->INDEPENDENT
TRANSFERRING: 0-->INDEPENDENT
AMBULATION: 0-->INDEPENDENT

## 2020-07-05 ASSESSMENT — ENCOUNTER SYMPTOMS
PALPITATIONS: 1
GASTROINTESTINAL NEGATIVE: 1
CONSTITUTIONAL NEGATIVE: 1
DIZZINESS: 1
MYALGIAS: 1

## 2020-07-05 ASSESSMENT — MIFFLIN-ST. JEOR
SCORE: 1876.48
SCORE: 1876.48

## 2020-07-05 NOTE — ED PROVIDER NOTES
ED Provider Note  [facility]      History     Chief Complaint   Patient presents with     Irregular Heart Beat     The history is provided by the patient and medical records.     Chente Cerna is a 44 year old male who presents to the ED today via EMS from urgent care for irregular heart beat. EMS reports his HR in the 20s-30s.The patient reports that he had a deer tick bite about 3 weeks ago. He then began having some chills, aches, full body rash, and fever. He tested negative for Covid x2 (6/18 and 6/24). He states that he has still had lingering symptoms since then. He reports feeling lightheaded when standing yesterday, but denies syncope. He reports feeling very dizzy this morning, palpitations, left neck swelling, and body aches. He has no cardiac history.  Went to urgent care today and was found to be in complete heart block.  He is essentially asymptomatic.  No chest pain no dyspnea he is not had syncope.  He did develop a rash after the tick bite and he is quite certain that it was a deer tick.  He has had anaphylactic reactions to penicillin drugs in the past.      Past Medical History  Past Medical History:   Diagnosis Date     Kidney stone 3/12/2013     Past Surgical History:   Procedure Laterality Date     GENITOURINARY SURGERY  vasectomy     escitalopram (LEXAPRO) 10 MG tablet  albuterol (PROAIR HFA, PROVENTIL HFA, VENTOLIN HFA) 108 (90 BASE) MCG/ACT inhaler  ibuprofen 200 MG capsule      Allergies   Allergen Reactions     Penicillins Anaphylaxis and Hives     Nuts Swelling     06/22/20 per patient - certain nuts cause odd sensation in throat which resolves within 30 minutes without action. These nuts include: hazelnut, almond, brazil, pistachio, macadamia. Eats without problems: peanuts, cashews, pecans, walnuts.       Amoxicillin      No Clinical Screening - See Comments      Blue cheese     Red Wine Complex [Red Wine Powder]      Mucous & congestion     Past medical  "history, past surgical history, medications, and allergies were reviewed with the patient. Additional pertinent items: None    Family History  Family History   Problem Relation Age of Onset     Anxiety Disorder Mother         Hx of Zoloft, now on Lexapro     Thyroid Disease Mother      Heart Defect Father         Replaced heart valve     Anxiety Disorder Brother         On Lexapro     Melanoma Brother      Hypertension Maternal Grandfather      Asthma Paternal Grandfather      Family history was reviewed with the patient. Additional pertinent items: None    Social History  Social History     Tobacco Use     Smoking status: Former Smoker     Smokeless tobacco: Never Used     Tobacco comment: Quit at age 21 yo, periodic use until age 26   Substance Use Topics     Alcohol use: Yes     Alcohol/week: 2.5 standard drinks     Types: 3 Cans of beer per week     Comment: ocassional     Drug use: No      Social history was reviewed with the patient. Additional pertinent items: None    Review of Systems   Constitutional: Negative.    Cardiovascular: Positive for palpitations.   Gastrointestinal: Negative.    Genitourinary: Negative.    Musculoskeletal: Positive for myalgias.   Skin: Negative for rash.   Neurological: Positive for dizziness. Negative for syncope.   All other systems reviewed and are negative.    A complete review of systems was performed with pertinent positives and negatives noted in the HPI, and all other systems negative.    Physical Exam   BP: (!) 144/104  Pulse: (!) 30  Heart Rate: (!) 28  Temp: 99.2  F (37.3  C)  Resp: 16  Height: 193 cm (6' 4\")  Weight: 88 kg (194 lb)  SpO2: 100 %  Physical Exam  Vitals signs and nursing note reviewed.   Constitutional:       General: He is not in acute distress.     Appearance: He is well-developed.   HENT:      Head: Normocephalic and atraumatic.   Eyes:      General: No scleral icterus.     Conjunctiva/sclera: Conjunctivae normal.      Pupils: Pupils are equal, round, " and reactive to light.   Neck:      Musculoskeletal: Neck supple.   Cardiovascular:      Rate and Rhythm: Regular rhythm. Bradycardia present.      Heart sounds: Normal heart sounds.   Pulmonary:      Effort: Pulmonary effort is normal. No respiratory distress.      Breath sounds: Normal breath sounds. No wheezing.   Skin:     General: Skin is warm and dry.      Findings: No rash.   Neurological:      General: No focal deficit present.      Mental Status: He is alert and oriented to person, place, and time.      Cranial Nerves: No cranial nerve deficit.   Psychiatric:         Mood and Affect: Mood normal.         Behavior: Behavior normal.         ED Course      Procedures             EKG Interpretation:      Interpreted by Javier Merida MD  Time reviewed: 12:20 PM   Symptoms at time of EKG: gabriela   Rhythm: normal sinus   Rate: 25  Axis: normal  Ectopy: none  Conduction: normal  ST Segments/ T Waves: No ST-T wave changes  Q Waves: none  Comparison to prior: No old EKG available    Clinical Impression:  Complete heart block      Discussed with EP who will see the patient     Results for orders placed or performed during the hospital encounter of 07/05/20   CBC with platelets differential     Status: Abnormal   Result Value Ref Range    WBC 6.7 4.0 - 11.0 10e9/L    RBC Count 4.21 (L) 4.4 - 5.9 10e12/L    Hemoglobin 12.2 (L) 13.3 - 17.7 g/dL    Hematocrit 39.3 (L) 40.0 - 53.0 %    MCV 93 78 - 100 fl    MCH 29.0 26.5 - 33.0 pg    MCHC 31.0 (L) 31.5 - 36.5 g/dL    RDW 12.5 10.0 - 15.0 %    Platelet Count 344 150 - 450 10e9/L    Diff Method Automated Method     % Neutrophils 51.6 %    % Lymphocytes 30.8 %    % Monocytes 14.7 %    % Eosinophils 1.9 %    % Basophils 0.9 %    % Immature Granulocytes 0.1 %    Nucleated RBCs 0 0 /100    Absolute Neutrophil 3.5 1.6 - 8.3 10e9/L    Absolute Lymphocytes 2.1 0.8 - 5.3 10e9/L    Absolute Monocytes 1.0 0.0 - 1.3 10e9/L    Absolute Eosinophils 0.1 0.0 - 0.7 10e9/L     Absolute Basophils 0.1 0.0 - 0.2 10e9/L    Abs Immature Granulocytes 0.0 0 - 0.4 10e9/L    Absolute Nucleated RBC 0.0    EKG 12 lead     Status: None (Preliminary result)   Result Value Ref Range    Interpretation ECG Click View Image link to view waveform and result      Medications   doxycycline (VIBRAMYCIN) 100 mg vial to attach to  mL bag (has no administration in time range)        Assessments & Plan (with Medical Decision Making)   Patient with asymptomatic complete heart block likely related to diagnose Lyme's disease based on history.  He has had severe reactions to penicillin in the past so he was given a dose of IV doxycycline.  Cardiology was consulted and saw the patient he will go to the Cath Lab for temporary pacer placement and then be admitted to their service with an ID consult.  He remained asymptomatic in the department.  Routine labs are still pending.    I have reviewed the nursing notes. I have reviewed the findings, diagnosis, plan and need for follow up with the patient.    New Prescriptions    No medications on file       Final diagnoses:   Complete heart block (H)   Lyme disease   I, Bella Romero, am serving as a trained medical scribe to document services personally performed by Javier Merida MD, based on the provider's statements to me.     I, Javier Merida MD, was physically present and have reviewed and verified the accuracy of this note documented by Bella Romero.      --  Javier Merida MD  Emergency Medicine   [facility], [facility] EMERGENCY DEPARTMENT  7/5/2020     Javier Merida MD  07/05/20 0905

## 2020-07-05 NOTE — CONSULTS
Cardiac Electrophysiology consultation      Reason for consultation:  bradycardia    HPI:  45 yo male presented to the ED with several days of fever, chills, body aches, and lightheadedness while standing in the setting of a tick bite three weeks ago.  He had a rash over his whole body that has since resolved.  EKG in the emergency department showed sinus tachycardia with high grade AV block and a ventricular rate of 26 bpm, narrow QRS,  ms.  Currently, lying in bed, he feels comfortable and denies chest pain, dyspnea, lightheadedness, and palpitations.  He denies a family history of cardiomyopathy and conduction block.  Father underwent replacement of a bicuspid aortic valve and has AF.    No current facility-administered medications on file prior to encounter.   escitalopram (LEXAPRO) 10 MG tablet, TAKE 1 AND 1/2 TABLETS BY  MOUTH DAILY  albuterol (PROAIR HFA, PROVENTIL HFA, VENTOLIN HFA) 108 (90 BASE) MCG/ACT inhaler, Inhale 2 puffs into the lungs every 6 hours as needed for shortness of breath / dyspnea or wheezing  ibuprofen 200 MG capsule, Take 200 mg by mouth every 4 hours as needed      Allergies   Allergen Reactions     Penicillins Anaphylaxis and Hives     Nuts Swelling     06/22/20 per patient - certain nuts cause odd sensation in throat which resolves within 30 minutes without action. These nuts include: hazelnut, almond, brazil, pistachio, macadamia. Eats without problems: peanuts, cashews, pecans, walnuts.       Amoxicillin      No Clinical Screening - See Comments      Blue cheese     Red Wine Complex [Red Wine Powder]      Mucous & congestion       Past Medical History:   Diagnosis Date     Kidney stone 3/12/2013     Past Surgical History:   Procedure Laterality Date     GENITOURINARY SURGERY  vasectomy     Family History   Problem Relation Age of Onset     Anxiety Disorder Mother         Hx of Zoloft, now on Lexapro     Thyroid Disease Mother      Heart Defect Father         Replaced heart  "valve     Anxiety Disorder Brother         On Lexapro     Melanoma Brother      Hypertension Maternal Grandfather      Asthma Paternal Grandfather      Social History     Socioeconomic History     Marital status:      Spouse name: Not on file     Number of children: Not on file     Years of education: Not on file     Highest education level: Not on file   Occupational History     Not on file   Social Needs     Financial resource strain: Not on file     Food insecurity     Worry: Not on file     Inability: Not on file     Transportation needs     Medical: Not on file     Non-medical: Not on file   Tobacco Use     Smoking status: Former Smoker     Smokeless tobacco: Never Used     Tobacco comment: Quit at age 21 yo, periodic use until age 26   Substance and Sexual Activity     Alcohol use: Yes     Alcohol/week: 2.5 standard drinks     Types: 3 Cans of beer per week     Comment: ocassional     Drug use: No     Sexual activity: Yes   Lifestyle     Physical activity     Days per week: Not on file     Minutes per session: Not on file     Stress: Not on file   Relationships     Social connections     Talks on phone: Not on file     Gets together: Not on file     Attends [employer] service: Not on file     Active member of club or organization: Not on file     Attends meetings of clubs or organizations: Not on file     Relationship status: Not on file     Intimate partner violence     Fear of current or ex partner: Not on file     Emotionally abused: Not on file     Physically abused: Not on file     Forced sexual activity: Not on file   Other Topics Concern     Parent/sibling w/ CABG, MI or angioplasty before 65F 55M? Not Asked   Social History Narrative     Not on file     A complete review of systems is negative except as noted above in the HPI.    Physical Examination:  Vitals: /67   Pulse (!) 32   Temp 99.2  F (37.3  C) (Oral)   Resp 21   Ht 1.93 m (6' 4\")   Wt 88 kg (194 lb)   SpO2 98%   BMI 23.61 " kg/m    GENERAL APPEARANCE: comfortable appearing male lying in bed in no apparent distress.  HEENT: no scleral icterus  NECK:  JVP is not elevated.  No lesions over proposed right jugular venous access site.  CHEST: equal chest rise, unlabored breathing.  CARDIOVASCULAR:  Slow, irregular, ST with high grade AV block on tele, warm extremities, no peripheral edema.  ABDOMEN: soft, not tender.  NEURO: alert and fully oriented, fluent speech.  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  Although not symptomatic at rest, we feel the risk of worsening AV block and bradycardia outweighs the risk of temporary pacemaker placement, especially in light of his PCN allergy which carries some risk of cross reactivity with ceftriaxone and could potentially require desensitization.    After we discussed its risks, benefits, and alternatives, Mr. Cerna would like to proceed with temporary pacemaker placement.    I discussed the patient with Dr. Emani Lopez.    Deni Raymond MD  Cardiac electrophysiology fellow    I have seen, interviewed, and examined patient. I reviewed the management plan with the patient.  I have reviewed the laboratory tests, imaging, and other investigations.  Discussed with the team and agree with the findings and plan in this resident/fellow/nurse practitioner's note. In addition, changes in the physical examination, assessment and plan have  been incorporated into the note by myself, as to make it a single cohesive document. Plan was communicated to referring team/physician.      Emani Lopez MD, MS  Cardiology/Cardiac EP Attending Staff

## 2020-07-05 NOTE — Clinical Note
dry, intact, no bleeding and no hematoma. 7 Fr RIJ suturexc, biopatch, capped tegaderm Temp Pacer - MA 5 VVI Rate 60

## 2020-07-05 NOTE — ED NOTES
Bed: ED01  Expected date: 7/5/20  Expected time: 11:32 AM  Means of arrival:   Comments:  SPF 19. Boyd in 20s. Recent tick bite. Negative covid.

## 2020-07-05 NOTE — PROGRESS NOTES
Focus: Admission   Admitted from: cath lab  Via: stretcher   Accompanied by: family   Belongings: Placed in closet; valuables sent home with wife.  Admission documentation: complete.   Teaching: Call don't fall, use of console, meal times, visiting hours, orientation to unit, when to call for the RN angina/sob/dizzyness, and stressed the importance of safety.   Access: PIV   Telemetry: Placed on pt   Ht./Wt.: complete   2 person skin check completed with Heather FARMER RN

## 2020-07-05 NOTE — ED TRIAGE NOTES
Pt BIBA from urgent care with complete heart block. HR 20s-30s. Had tick bite a month ago with a subsequent full body rash. Recently started not feeling well. No cardiac hx.

## 2020-07-05 NOTE — ED NOTES
[facility], [place]   ED Nurse to Floor Handoff     Chente Cerna is a 44 year old male who speaks English and lives with family members,  in a home  They arrived in the ED by ambulance from clinic    ED Chief Complaint: Irregular Heart Beat    ED Dx;   Final diagnoses:   Complete heart block (H)   Lyme disease         Needed?: No    Allergies:   Allergies   Allergen Reactions     Penicillins Anaphylaxis and Hives     Nuts Swelling     06/22/20 per patient - certain nuts cause odd sensation in throat which resolves within 30 minutes without action. These nuts include: hazelnut, almond, brazil, pistachio, macadamia. Eats without problems: peanuts, cashews, pecans, walnuts.       Amoxicillin      No Clinical Screening - See Comments      Blue cheese     Red Wine Complex [Red Wine Powder]      Mucous & congestion   .  Past Medical Hx:   Past Medical History:   Diagnosis Date     Kidney stone 3/12/2013      Baseline Mental status: WDL  Current Mental Status changes: at basesline    Infection present or suspected this encounter: yes other Lyme?  Sepsis suspected: No  Isolation type: No active isolations     Activity level - Baseline/Home:  Independent  Activity Level - Current:   Independent    Bariatric equipment needed?: No    In the ED these meds were given:   Medications   doxycycline (VIBRAMYCIN) 100 mg vial to attach to  mL bag (100 mg Intravenous New Bag 7/5/20 1259)       Drips running?  Yes    Home pump  No    Current LDAs  Peripheral IV 07/05/20 Left (Active)   Site Assessment WDL 07/05/20 1135   Line Status Infusing 07/05/20 1135   Phlebitis Scale 0-->no symptoms 07/05/20 1135   Number of days: 0       Labs results:   Labs Ordered and Resulted from Time of ED Arrival Up to the Time of Departure from the ED   CBC WITH PLATELETS DIFFERENTIAL - Abnormal; Notable for the following components:       Result Value    RBC Count 4.21 (*)     Hemoglobin 12.2 (*)      "Hematocrit 39.3 (*)     MCHC 31.0 (*)     All other components within normal limits   COMPREHENSIVE METABOLIC PANEL   TROPONIN I   LYME DISEASE CHERYL WITH REFLEX TO WB SERUM   BABESIA ANTIBODY IGM   EHRLICHIA ANAPLASMA SP BY PCR       Imaging Studies: No results found for this or any previous visit (from the past 24 hour(s)).    Recent vital signs:   /67   Pulse (!) 32   Temp 99.2  F (37.3  C) (Oral)   Resp 21   Ht 1.93 m (6' 4\")   Wt 88 kg (194 lb)   SpO2 98%   BMI 23.61 kg/m      Abelino Coma Scale Score: 15 (07/05/20 1144)       Cardiac Rhythm: Other, complete block  Pt needs tele? Yes  Skin/wound Issues: None    Code Status: Full Code    Pain control: pt had none    Nausea control: pt had none    Abnormal labs/tests/findings requiring intervention: bradycardia, heart block    Family present during ED course? No   Family Comments/Social Situation comments: none    Tasks needing completion: None      Heather Beyer, RN  [phone] [facility] ED    "

## 2020-07-05 NOTE — PROGRESS NOTES
SUBJECTIVE:   Chente Cerna is a 44 year old male presenting with a chief complaint of fever to 103 that started on June 17th, with chills, myalgias, headache. He had COVID test was negative on the 18th.  Fever would come and go through the 21st of June . Intermittent macular rash,  Started on June 22nd and then faded 3 days ago. COVID 19 was negative again on the 24th.  Currently he has body aches,  Left swelling in the neck, dizziness with bending over and standing up.      Onset of symptoms was 18 day(s) ago.  Course of illness is waxing and waning.    Severity moderate  Previous Treatment Ibuprofen.      Past Medical History:   Diagnosis Date     Kidney stone 3/12/2013     Current Outpatient Medications   Medication Sig Dispense Refill     escitalopram (LEXAPRO) 10 MG tablet TAKE 1 AND 1/2 TABLETS BY  MOUTH DAILY 135 tablet 0     ibuprofen 200 MG capsule Take 200 mg by mouth every 4 hours as needed       albuterol (PROAIR HFA, PROVENTIL HFA, VENTOLIN HFA) 108 (90 BASE) MCG/ACT inhaler Inhale 2 puffs into the lungs every 6 hours as needed for shortness of breath / dyspnea or wheezing 1 Inhaler 0     Social History     Tobacco Use     Smoking status: Former Smoker     Smokeless tobacco: Never Used     Tobacco comment: Quit at age 21 yo, periodic use until age 26   Substance Use Topics     Alcohol use: Yes     Alcohol/week: 2.5 standard drinks     Types: 3 Cans of beer per week     Comment: ocassional     Family History   Problem Relation Age of Onset     Anxiety Disorder Mother         Hx of Zoloft, now on Lexapro     Thyroid Disease Mother      Heart Defect Father         Replaced heart valve     Anxiety Disorder Brother         On Lexapro     Melanoma Brother      Hypertension Maternal Grandfather      Asthma Paternal Grandfather         ROS: 10 point ROS neg other than the symptoms noted above in the HPI.     OBJECTIVE  /80   Pulse (!) 36   Temp 99.7  F (37.6  C) (Oral)   Resp 14   Ht 1.93 m (6'  "4\")   Wt 88 kg (194 lb)   BMI 23.61 kg/m        GENERAL APPEARANCE: healthy appearing, alert     EYES: PERRL, EOMI, sclera non-icteric     HENT: oral exam benign, mucus membranes intact, without ulcers or lesions     NECK: no adenopathy and cervical adenopathy on the left anterior     RESP: lungs clear to auscultation - no rales, rhonchi or wheezes     CV: regular rates and rhythm, no murmurs, rubs, or gallop     ABDOMEN:  soft, nontender, no HSM or masses and bowel sounds normal     MS: extremities normal- no gross deformities noted; normal muscle tone.     SKIN: no suspicious lesions or rashes     NEURO: Normal strength and tone, mentation intact and speech normal     PSYCH: normal thought process; no significant mood disturbance    ECG- shows 3rd degree heart block with ventricular rate of 28 as read by this provider    ASSESSMENT/PLAN:      ICD-10-CM    1. Third degree heart block by electrocardiogram (H)  I44.2    2. Bradycardia  R00.1 EKG 12-lead complete w/read - Clinics   3. Other fatigue  R53.83       911 called and patient is transported emergently to [facility] via paramedics.      Follow Up:      There are no Patient Instructions on file for this visit.    PRISCILLA Genao, CNP  [facility] Provider  "

## 2020-07-05 NOTE — BRIEF OP NOTE
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  Complications: None.  Implants:   Implant Name Type Inv. Item Serial No.  Lot No. LRB No. Used Action   IMP LEAD PACING BIPOLAR CAPSUREFIX NOVUS 58CM 5076-58 Leads IMP LEAD PACING BIPOLAR CAPSUREFIX NOVUS 58CM 5076-58 [id number] [id number]  1 Implanted       Capture threshold is <2 mA.  Programmed VVI 60 with pacing output of 5 mA.    Full report is under Chart Review > Cardiology.    Please page [phone] with any questions.    Dr. Emani Lopez was present throughout the entire procedure.    Deni Raymond MD  Cardiac electrophysiology fellow

## 2020-07-05 NOTE — PROGRESS NOTES
[facility]  Transfer Triage Note    Date of call: 07/05/20  Time of call: 12:07 PM    Is pandemic COVID-19 a concern? NO (Negative tests 6/18/20 and 6/24/20)    Reason for transfer: Further diagnostic work up, management, and consultation for specialized care   Diagnosis: Complete heart block, possible Lyme Disease    Outside Records: Available  Additional records requested to be faxed to [phone].    Stability of Patient: Patient is at risk for instability, however patient requires urgent transfer and does not meet ICU criteria   ICU: No   Bed: Cardiology    Expected Time of Arrival for Transfer: 0-8 hours    Arrival Location:  [place] [address] Phone: [phone]    Recommendations for Management and Stabilization: Not needed    Additional Comments: Patient is 44 year old man without cardiac history who had a tick bite a month ago, followed by a full body rash presents with complete heart block and ventricular rates in the 20s-30s. Preceding symptoms include two weeks of fever, chills, myalgias, headache, dizziness, and intermittent macular rash. Cardiology and EP consults pending.     Shun Moreland, MS4  [facility] [employer]

## 2020-07-05 NOTE — H&P
[facility] Cardiology History and Physical    Chente Cerna MRN# [id number]   Age: 44 year old YOB: 1975     Date of Admission:  7/5/2020    Primary care provider: Guerrero Iqbal          Assessment and Plan:   Assessment:  Mr. Chente Cerna is a 44 yom with a pmhx significant for anxiety presenting with high grade AV block and symptomatic bradycardia in the setting of multiple recent tick bites, febrile illness, and scattered polyarthralgia's concerning for lyme carditis.     Plan:  #. High grade atrioventricular block  #. Symptomatic bradycardia (Vent rate 20s)  Possibly secondary to lyme carditis. Seen by EP in ED, temporary pacer wire placed 7/5/20. No personal of family hx of conduction disease.   -- EP following, appreciate recommendations  -- VVI 60 with pacing output 5 mA (capture threshold <2 mA)    #. Concern for lyme carditis  #. Febrile illness  Suspected dx given recent history of multiple tick bites, dermatologic manifestations, polyarthalgias, fevers. Will require IV antibiotics until block resolves, then 3-4 weeks of PO completion. Negative COVID-19 x 2 in June 2020.   -- lyme serologies pending  -- broad tick borne illness serologies pending  -- ID consult, appreciate recommendations especially as it relates to desensitization  -- continue doxycycline for now, will need to transition to ceftriaxone ASAP, see below    #. Penicillin anaphylaxis  -- transfer orders for ICU placed  -- when patient arrives, will arrange desensitization orders for ceftriaxone    #. Normocytic anemia  Hgb 12.2 on admission. Last 13.7 in 6/2018. No s/sx of bleeding. May be related to ongoing infection.   -- defer work up to PCP as outpatient  -- CBC in AM    ================  #. Chronic Problems  # Generalized anxiety: continue escitalopram 15 mg q day    FEN: Regular diet, ambulation, no fluids  Prophylaxis: Ambulation   Consults: ID, EP, Pharmacy    Code Status: FULL (confirmed with  patient on 7/5 at admit)    Disposition: 6C tele for now    Patient seen and discussed with Dr. Woods, who agrees with above plan.    Zak Hahn MD  Internal Medicine PGY-3  [phone]          Chief Complaint:   Bradycardia           History of Present Illness:   Mr. Chente Cerna is a 44 yom with a pmhx significant for generalized anxiety presenting with high grade AV block and symptomatic bradycardia in the setting of multiple recent tick bites, febrile illness, and scattered polyarthralgia's concerning for lyme carditis.    Briefly, patient descries a 2-3 week period of evolving/intermittent fevers, chills, polyarthalgias, myalgias, and generalized malaise. He and his wife provide that he was bitten by multiple ticks the week of June 19th. They were able to remove them without issue, though they are unsure how long they were attached. In the 72 hours following this bites he developed a widespread rash across his trunk and extremities (see Media tab in Epic, 6/23/20). These resolved without intervention. He continued to feel weak with intermittent fever spikes which led to multiple COVID-19 PCR tests in June, both of which were negative.     Finally, he developed presyncope and dizziness on 7/5 which was quite bothersome which led to an urgent care visit. He was found to be in high grade AV block with bradycardia, ventricular rates in the 20s. Was subsequently transferred to [facility] ED for further evaluation where he was taken directly to the EP cath lab for placement of a temporary pacer wire.     Denies any personal or family history of conduction disease. Father with valve replacement and AF.     Patient denies CP, SOB, cough, HAs, N/V/D, hematochezia,  melena, dysuria, flank pain, paresthesia, weakness.     Denies any known contact with COVID-19 patients, has tested negative twice in June.      All questions answered, patient's wife is primary contact/visitor. Contact information on white board.           Review of Systems:      Skin: Without rash, scaling, or erythema; good turgor  HEENT: Denies changes in vision, hearing, taste, no lymphadenopathy  Cardio: Palpitations, pre-syncope  Respiratory: Denies SOB, SOLIMAN, wheezing  GI: Denies abdominal pain, diarrhea, constipation  : Denies urinary frequency, dysuria, hematuria  MSK: Denies weakness, polyarthralgia, myalgias  Neuro: Denies focal weakness, diminished sensation, abnormal gait  Psych: Denies hallucinations, delusion  Heme: Denies bruising, bleeding  Endo: Denies intolerance to heat or cold, no dry skin, brittle hair         Past Medical History:     Past Cardiac History:N/A    Last Echocardiogram: N/A    Last Catheterization: N/A    Medical History reviewed.   Past Medical History:   Diagnosis Date     Kidney stone 3/12/2013             Past Surgical History:   Surgical History reviewed.   Past Surgical History:   Procedure Laterality Date     GENITOURINARY SURGERY  vasectomy             Social History:   Social History reviewed.   Social History     Tobacco Use     Smoking status: Former Smoker     Smokeless tobacco: Never Used     Tobacco comment: Quit at age 23 yo, periodic use until age 26   Substance Use Topics     Alcohol use: Yes     Alcohol/week: 2.5 standard drinks     Types: 3 Cans of beer per week     Comment: ocassional             Family History:   Family History reviewed.  Family History   Problem Relation Age of Onset     Anxiety Disorder Mother         Hx of Zoloft, now on Lexapro     Thyroid Disease Mother      Heart Defect Father         Replaced heart valve     Anxiety Disorder Brother         On Lexapro     Melanoma Brother      Hypertension Maternal Grandfather      Asthma Paternal Grandfather              Allergies:     Allergies   Allergen Reactions     Penicillins Anaphylaxis and Hives     Nuts Swelling     06/22/20 per patient - certain nuts cause odd sensation in throat which resolves within 30 minutes without action. These nuts  "include: hazelnut, almond, brazil, pistachio, macadamia. Eats without problems: peanuts, cashews, pecans, walnuts.       Amoxicillin      No Clinical Screening - See Comments      Blue cheese     Red Wine Complex [Red Wine Powder]      Mucous & congestion             Medications:   Medications Reviewed.   Current Facility-Administered Medications   Medication     acetaminophen (TYLENOL) Suppository 650 mg     acetaminophen (TYLENOL) tablet 650 mg     doxycycline (VIBRAMYCIN) 100 mg vial to attach to  mL bag     lidocaine (LMX4) cream     lidocaine 1 % 0.1-1 mL     medication instruction     sodium chloride (PF) 0.9% PF flush 3 mL     sodium chloride (PF) 0.9% PF flush 3 mL             Physical Exam:   Vitals were reviewed.  Blood pressure 132/79, pulse (!) 32, temperature 98  F (36.7  C), temperature source Oral, resp. rate 14, height 1.93 m (6' 4\"), weight 88 kg (194 lb), SpO2 98 %.    General: AAOx3, NAD  Skin: Not jaundiced, no rash, no ecchymoses  HEENT: MMM, PERRLA, EOM intact  CV: RRR, normal S1S2, no murmur, clicks, rubs  Resp: Clear to auscultation bilaterally, no wheezes, rhonchi  Abd: Soft, non-tender, BS+, no masses appreciated  Extremities: warm and well perfused, palpable pulses, no edema  Neuro: No lateralizing symptoms or focal neurologic deficits        "

## 2020-07-06 LAB
B BURGDOR IGG+IGM SER QL: 7.98 (ref 0–0.89)
C PNEUM DNA SPEC QL NAA+PROBE: NOT DETECTED
ERYTHROCYTE [DISTWIDTH] IN BLOOD BY AUTOMATED COUNT: 12.5 % (ref 10–15)
FERRITIN SERPL-MCNC: 164 NG/ML (ref 26–388)
FLUAV H1 2009 PAND RNA SPEC QL NAA+PROBE: NOT DETECTED
FLUAV H1 RNA SPEC QL NAA+PROBE: NOT DETECTED
FLUAV H3 RNA SPEC QL NAA+PROBE: NOT DETECTED
FLUAV RNA SPEC QL NAA+PROBE: NOT DETECTED
FLUBV RNA SPEC QL NAA+PROBE: NOT DETECTED
HADV DNA SPEC QL NAA+PROBE: NOT DETECTED
HCOV PNL SPEC NAA+PROBE: NOT DETECTED
HCT VFR BLD AUTO: 37.1 % (ref 40–53)
HGB BLD-MCNC: 11.5 G/DL (ref 13.3–17.7)
HMPV RNA SPEC QL NAA+PROBE: NOT DETECTED
HPIV1 RNA SPEC QL NAA+PROBE: NOT DETECTED
HPIV2 RNA SPEC QL NAA+PROBE: NOT DETECTED
HPIV3 RNA SPEC QL NAA+PROBE: NOT DETECTED
HPIV4 RNA SPEC QL NAA+PROBE: NOT DETECTED
INTERPRETATION ECG - MUSE: NORMAL
IRON SATN MFR SERPL: 13 % (ref 15–46)
IRON SERPL-MCNC: 34 UG/DL (ref 35–180)
M PNEUMO DNA SPEC QL NAA+PROBE: NOT DETECTED
MCH RBC QN AUTO: 28.8 PG (ref 26.5–33)
MCHC RBC AUTO-ENTMCNC: 31 G/DL (ref 31.5–36.5)
MCV RBC AUTO: 93 FL (ref 78–100)
MICROBIOLOGIST REVIEW: NORMAL
PLATELET # BLD AUTO: 305 10E9/L (ref 150–450)
RBC # BLD AUTO: 3.99 10E12/L (ref 4.4–5.9)
RETICS # AUTO: 51 10E9/L (ref 25–95)
RETICS/RBC NFR AUTO: 1.4 % (ref 0.5–2)
RSV RNA SPEC QL NAA+PROBE: NOT DETECTED
RSV RNA SPEC QL NAA+PROBE: NOT DETECTED
RV+EV RNA SPEC QL NAA+PROBE: NOT DETECTED
TIBC SERPL-MCNC: 256 UG/DL (ref 240–430)
TRANSFERRIN SERPL-MCNC: 197 MG/DL (ref 210–360)
WBC # BLD AUTO: 5.8 10E9/L (ref 4–11)

## 2020-07-06 PROCEDURE — 82728 ASSAY OF FERRITIN: CPT | Performed by: STUDENT IN AN ORGANIZED HEALTH CARE EDUCATION/TRAINING PROGRAM

## 2020-07-06 PROCEDURE — 25000125 ZZHC RX 250: Performed by: EMERGENCY MEDICINE

## 2020-07-06 PROCEDURE — 36415 COLL VENOUS BLD VENIPUNCTURE: CPT | Performed by: STUDENT IN AN ORGANIZED HEALTH CARE EDUCATION/TRAINING PROGRAM

## 2020-07-06 PROCEDURE — 83550 IRON BINDING TEST: CPT | Performed by: STUDENT IN AN ORGANIZED HEALTH CARE EDUCATION/TRAINING PROGRAM

## 2020-07-06 PROCEDURE — 84466 ASSAY OF TRANSFERRIN: CPT | Performed by: STUDENT IN AN ORGANIZED HEALTH CARE EDUCATION/TRAINING PROGRAM

## 2020-07-06 PROCEDURE — 85045 AUTOMATED RETICULOCYTE COUNT: CPT | Performed by: STUDENT IN AN ORGANIZED HEALTH CARE EDUCATION/TRAINING PROGRAM

## 2020-07-06 PROCEDURE — 85027 COMPLETE CBC AUTOMATED: CPT | Performed by: STUDENT IN AN ORGANIZED HEALTH CARE EDUCATION/TRAINING PROGRAM

## 2020-07-06 PROCEDURE — 83540 ASSAY OF IRON: CPT | Performed by: STUDENT IN AN ORGANIZED HEALTH CARE EDUCATION/TRAINING PROGRAM

## 2020-07-06 PROCEDURE — 87389 HIV-1 AG W/HIV-1&-2 AB AG IA: CPT | Performed by: STUDENT IN AN ORGANIZED HEALTH CARE EDUCATION/TRAINING PROGRAM

## 2020-07-06 PROCEDURE — 99232 SBSQ HOSP IP/OBS MODERATE 35: CPT | Mod: GC | Performed by: INTERNAL MEDICINE

## 2020-07-06 PROCEDURE — 20000004 ZZH R&B ICU UMMC

## 2020-07-06 PROCEDURE — 87633 RESP VIRUS 12-25 TARGETS: CPT | Performed by: STUDENT IN AN ORGANIZED HEALTH CARE EDUCATION/TRAINING PROGRAM

## 2020-07-06 PROCEDURE — 25800030 ZZH RX IP 258 OP 636: Performed by: INTERNAL MEDICINE

## 2020-07-06 PROCEDURE — 87486 CHLMYD PNEUM DNA AMP PROBE: CPT | Performed by: STUDENT IN AN ORGANIZED HEALTH CARE EDUCATION/TRAINING PROGRAM

## 2020-07-06 PROCEDURE — 25000132 ZZH RX MED GY IP 250 OP 250 PS 637: Performed by: STUDENT IN AN ORGANIZED HEALTH CARE EDUCATION/TRAINING PROGRAM

## 2020-07-06 PROCEDURE — 87581 M.PNEUMON DNA AMP PROBE: CPT | Performed by: STUDENT IN AN ORGANIZED HEALTH CARE EDUCATION/TRAINING PROGRAM

## 2020-07-06 PROCEDURE — 86803 HEPATITIS C AB TEST: CPT | Performed by: STUDENT IN AN ORGANIZED HEALTH CARE EDUCATION/TRAINING PROGRAM

## 2020-07-06 PROCEDURE — 25000128 H RX IP 250 OP 636: Performed by: INTERNAL MEDICINE

## 2020-07-06 RX ORDER — DIPHENHYDRAMINE HYDROCHLORIDE 50 MG/ML
50 INJECTION INTRAMUSCULAR; INTRAVENOUS
Status: DISCONTINUED | OUTPATIENT
Start: 2020-07-06 | End: 2020-07-09 | Stop reason: HOSPADM

## 2020-07-06 RX ORDER — METHYLPREDNISOLONE SODIUM SUCCINATE 125 MG/2ML
125 INJECTION, POWDER, LYOPHILIZED, FOR SOLUTION INTRAMUSCULAR; INTRAVENOUS
Status: DISCONTINUED | OUTPATIENT
Start: 2020-07-06 | End: 2020-07-09 | Stop reason: HOSPADM

## 2020-07-06 RX ADMIN — CEFTRIAXONE SODIUM 20 MG: 2 INJECTION, POWDER, FOR SOLUTION INTRAMUSCULAR; INTRAVENOUS at 15:36

## 2020-07-06 RX ADMIN — CEFTRIAXONE SODIUM 200 MG: 2 INJECTION, POWDER, FOR SOLUTION INTRAMUSCULAR; INTRAVENOUS at 16:44

## 2020-07-06 RX ADMIN — ACETAMINOPHEN 650 MG: 325 TABLET, FILM COATED ORAL at 01:34

## 2020-07-06 RX ADMIN — CEFTRIAXONE SODIUM 1780 MG: 2 INJECTION, POWDER, FOR SOLUTION INTRAMUSCULAR; INTRAVENOUS at 17:46

## 2020-07-06 RX ADMIN — ESCITALOPRAM 15 MG: 5 TABLET, FILM COATED ORAL at 08:04

## 2020-07-06 RX ADMIN — DOXYCYCLINE 100 MG: 100 INJECTION, POWDER, LYOPHILIZED, FOR SOLUTION INTRAVENOUS at 12:12

## 2020-07-06 ASSESSMENT — ACTIVITIES OF DAILY LIVING (ADL)
ADLS_ACUITY_SCORE: 11

## 2020-07-06 ASSESSMENT — MIFFLIN-ST. JEOR: SCORE: 1920.93

## 2020-07-06 NOTE — PROGRESS NOTES
[id number]:      Neuro:  A/O x 4. Call light appropriate.   Cardiac:  Temporary transvenous pacemaker VVI at 60 bpm. Other VSS.      Respiratory:   O2 saturation > 92% on RA.   GI/:  Good UO. LBM 7/5.   Diet/appetite:  Good appetite on regular diet; no caffeine.   Activity:  Independent   Pain:  Denied pain.   Skin:  WNL  LDA's:  L PIV- SL  Plan:  Transfer to ICU for antibiotic desensitization. Report called to GABRIELA Duval.

## 2020-07-06 NOTE — PLAN OF CARE
D: Admitted 7/5 with CHB and symptomatic bradycardia in the setting of multiple recent tick bites, febrile illness, and scattered polyarthralgia's concerning for lyme carditis. Hx of anxiety     I/A: A/Ox4. VSS on RA. Tylenol x1 for back pain/ generalized discomfort. Temporary transvenous pacemaker VVI @60. Tolerating regular diet without caffeine. Voiding adequately. Up SBA/I.    P: Will transfer to ICU today for abx desensitization. Continue to monitor and notify Cards 1 with changes/concerns.

## 2020-07-06 NOTE — PROGRESS NOTES
Penicillin/Cephalosporin Allergy Assessment    Antimicrobial Stewardship Program - A joint venture between [facility] and  Physicians to optimize antibiotic management.     Chente Cerna was identified for allergy assessment during this admission due to a documented allergy to a penicillin or cephalosporin antibiotic.  A detailed allergy history interview was completed on 07/06/20 to assess the appropriateness of the documented allergy.    Allergy History:   Question Response   What was the name of agent which caused the event?  Amoxicillin   What is the medication's indication?  Sinus infection    How was the medication given/taken?  Orally    How long ago was the reaction?  About 14 years ago (see ER note on 12/10/2006)   What was the symptoms of the event?  Full body hives slightly itchy, swollen tongue, and difficulty swallowing    How long after taking the medication did it take for the symptoms to begin?   3-4 days after taking amoxicillin    How was the reaction treated?     Patient presented to [facility] ED and was given epinephrine 0.3 mg subcutaneous, Ranitidine 150 mg po, diphenhydramine 25 mg po, and prednisone 40 mg po x1 dose of each in the ED and discharged with prednisone 40 mg po daily x5 days   Did you ever experience symptoms like this before?  No    Have you received the medication again without a reaction?  No    Have you received a similar medication without a reaction?  No    Can you name antibiotics that you've tolerated?  Azithromycin    Dates tolerated (if applicable):     Has the patient met [facility] Penicillin Skin Test Protocol for Penicillin Allergy Skin Testing?  Patient does qualify for PCN skin testing however, service is currently on hold due to COVID-19 pandemic      Information Source:   Patient and Chart Review (ED visit on 12/10/2006)    Additional Information: Per discussion with patient and chart review, patient presented  to [facility] ED on 12/10/2006 with full body hives, tongue swelling, and difficulty swallowing after 3-4 days of amoxicillin therapy for sinus infection. Per patient, he did not experience difficulty breathing. Patient stated he experiences similar reactions to blue cheese and some nuts such as hazelnut and almond. Patient is currently being considered for ceftriaxone therapy given concern for Lyme disease carditis. Given patient's amoxicillin reaction was 14 years ago, he qualifies for PCN skin testing however, service is currently on hold due to COVID-19 pandemic. It is reasonable to challenge with ceftriaxone as it is a 3rd generation cephalosporin with 1% cross-reactivity to penicillin. Per request from team, patient will be transferred to ICU to be initiated on ceftriaxone graded challenge with close monitoring. Given the desired dose for Lyme disease carditis is ceftriaxone 2 g daily,  graded challenge will be administered as follows: 1% of total dose (20 mg in 100 ml NS) followed by 10 % (200 mg in 100 ml NS) then remaining 89 % of total dose (1780 mg in 100 ml NS) all infused over 30 minutes and patient to be monitored for 30 minutes in between doses to insure no signs or symptoms of anaphylaxis. If patient tolerated graded challenge, can schedule ceftriaxone 2 g daily.     Please contact a pharmacist with any further allergy-related questions or concerns.  Cristal Gonzalez, PharmD, BCIDP   Pager: [phone]

## 2020-07-06 NOTE — PROGRESS NOTES
Cardiology Progress Note  7/6/2020      Assessment:  Mr. Chente Cerna is a 44 yom with a pmhx significant for anxiety presenting with high grade AV block and symptomatic bradycardia in the setting of multiple recent tick bites, febrile illness, and scattered polyarthralgia's concerning for lyme carditis.      Plan:  #. High grade atrioventricular block  #. Symptomatic bradycardia (Vent rate 20s)  Possibly secondary to lyme carditis. Seen by EP in ED, temporary pacer wire placed 7/5/20. No personal of family hx of conduction disease.   -- EP following, appreciate recommendations  -- VVI 60 with pacing output 5 mA (capture threshold <2 mA)     #. Lyme carditis  #. Febrile illness  Recent history of multiple tick bites, dermatologic manifestations, polyarthalgias, fevers now with positive Lyme serology. Will require IV antibiotics until block resolves, then 3-4 weeks of PO completion. Negative COVID-19 x 2 in June 2020.   -- reflex lyme western blot pending (7.98 lyme Ab)  -- broad tick borne illness serologies pending  -- ID consult, appreciate recommendations especially as it relates to desensitization and treatment  -- continue doxycycline for now, will need to transition to ceftriaxone ASAP, see below     #. Penicillin anaphylaxis  Prev anaphylaxis to ceftriaxone, pharmacy and ID working together to determine plan.  -- transfer orders for ICU placed, when patient arrives, will arrange desensitization orders for ceftriaxone, appreciate ID and pharmacy support     #. Normocytic anemia  Hgb 12.2 on admission. Last 13.7 in 6/2018. No s/sx of bleeding. May be related to ongoing infection.   -- CBC PRN  -- iron panel, retic count     ================  #. Chronic Problems  # Generalized anxiety: continue escitalopram 15 mg q day     FEN: Regular diet, ambulation, PO fluids  Prophylaxis: Ambulation   Consults: ID, EP, Pharmacy     Code Status: FULL (confirmed with patient on 7/5 at admit)     Disposition: 6C tele for  "now     Patient seen and discussed with Dr. Doll, who agrees with above plan.     Zak Hahn MD  Internal Medicine PGY-3  [phone]  ===================================================================    SUBJECTIVE: VIC o/n. Feels well overall, some mild soreness at pacer insertion site. Denies CP, SOB, HA, fevers. Understands plan of care. All questions answered. Transfer to ICU may be delayed due to staffing.     Nursing notes reviewed.    OBJECTIVE:  /72 (BP Location: Right arm)   Pulse 61   Temp 97.6  F (36.4  C) (Oral)   Resp 16   Ht 1.93 m (6' 4\")   Wt 92.4 kg (203 lb 12.8 oz)   SpO2 96%   BMI 24.81 kg/m    Temp (24hrs), Av.4  F (36.9  C), Min:97.6  F (36.4  C), Max:99.2  F (37.3  C)      I/O:    Intake/Output Summary (Last 24 hours) at 2020 1127  Last data filed at 2020 1059  Gross per 24 hour   Intake 1773 ml   Output 1800 ml   Net -27 ml       Wt:   Wt Readings from Last 5 Encounters:   20 92.4 kg (203 lb 12.8 oz)   20 88 kg (194 lb)   19 88.5 kg (195 lb)   18 85.3 kg (188 lb)   18 85.9 kg (189 lb 6.4 oz)       GEN: pleasant, no acute distress  HEENT: no icterus  SKIN: pacer wire site on right neck C/D/I  CV: RRR, normal s1/s2, no murmurs/rubs/s3/s4, no heave. .   CHEST: clear to ausculation bilaterally, no rales or wheezing  ABD: soft, non-tender, normal active bowel sounds  EXTR: pulses intact. No clubbing, cyanosis or edema.   NEURO: alert oriented, speech fluent/appropriate, motor grossly nonfocal    Labs: reviewed in EMR  CBC  Recent Labs   Lab 20  0547 20  1146   WBC 5.8 6.7   RBC 3.99* 4.21*   HGB 11.5* 12.2*   HCT 37.1* 39.3*   MCV 93 93   MCH 28.8 29.0   MCHC 31.0* 31.0*   RDW 12.5 12.5    344     BMP  Recent Labs   Lab 20  1146      POTASSIUM 4.4   CHLORIDE 106   CO2 28   ANIONGAP 4   GLC 94   BUN 23   CR 1.05   GFRESTIMATED 86   GFRESTBLACK >90   MADELYN 9.1     Troponins:     Lab Results   Component Value " Date    TROPI <0.015 07/05/2020     INRNo lab results found in last 7 days.    EKG  Bradycardia with high degree AV block prior to pacer placement on 7/5/20    Echocardiogram:  N/A    Coronary Angiogram:  N/A    Imaging: reviewed in EMR.

## 2020-07-06 NOTE — PLAN OF CARE
D: Transfer from  for desensitization to ceftriaxone.   I/A: Desensitization done with no signs of reaction. See flowsheets for vital signs.   R: Will continue to monitor/assess and update MD as needed.

## 2020-07-06 NOTE — CONSULTS
GENERAL ID SERVICE CONSULTATION     Patient:  Chente Cerna   Date of birth 1975, Medical record number [id number]  Date of Visit:  07/06/2020  Date of Admission: 7/5/2020  Consult Requester:Drew Woods MD          Assessment and Recommendations:   ASSESSMENT:  1. 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 Penicillin allergy    DISCUSSION:   Mr Cerna is a 44 year old male with a history of penicillin allergy, who presents with high degree AV block. His presentation is notable for deer tick bite, myalgias, and fevers. Lyme Ab positive, western blot pending. Thus would treat for lyme carditis with Ceftriaxone. Recommend challenging him with Ceftriaxone with close monitoring, and the assistance of pharmacy and allergy. I anticipate his treatment to be 14 to 21 days.     The differential for infectious carditis also includes viral myocarditis, and rheumatic heart disease 2/2 GAS. Group A strep is lower down on the differential given his lack of sick contacts. The history of fever and rash could be consistent with an acute HIV or Hepatitis C infection, so it is reasonable to test for those.    RECOMMENDATION:  1. Prefer start Ceftriaxone 2g IV q24h for presume lyme carditis -Will need ceftriaxone challenge in the ICU. Per Pharmacy, will proceed with Challenge v. Desensitization. Based on cross-reactivity between amoxicillin (previous anaphylactic reaction) and ceftriaxone, he is low risk for severe anaphylaxis. However, given his history will proceed with challenge in the ICU setting.   3. Please send HIV and Hepatitis C antibody  4. Please send Respiratory Viral Panel to assess for other causes of viral mycoarditis    Thank you for this consult. ID will continue to follow.     Patient was discussed with Dr. Ryan.     Patricia Mcdaniel MD  PGY-2, Internal Medicine-Pediatrics  Pager: [phone]      Attestation:    I have reviewed today's vital signs, medications, labs and imaging.  Floor time: 25  "minutes, Face-to-face time: 10 minutes, Total time: 35 minutes    Chente Cerna was seen in the hospital by Christen Ryan MD on 07/06/2020, with the resident Patricia Mcdaniel. I reviewed the history & exam. Assessment and plan were jointly made.  I agree with and have edited the note and plan of care.      Christen Ryan MD.  ID Staff  [id number]      July 6, 2020  ________________________________________________________________    Consult Question: Assistance with management of lyme carditis.  Admission Diagnosis: Lyme disease [A69.20]  Complete heart block (H) [I44.2]  ST elevation MI (STEMI) (H) [I21.3]         History of Present Illness:   Mr. Cerna is a 43yo M with atopy, penicillin allergy, and anxiety who presented on 7/5 with presyncope and was found to be bradycardic to the 20's with high grade AV block. ID was consulted for assistance with diagnosis and management of suspected lyme carditis.     Mr. Cerna was in his usual state of health until 6/17 when he began having fevers to 103F, chills, diffuse swollen lymph nodes, and body aches. These symptoms, including high fevers >101F, continued until about 6/21. As his fever subsided, he began to develop a rash. It started on his trunk, chest, and back, then spread up to his neck and face and down all four extremities. He describes this rash as \"hives\" similar to allergic reactions he's had in the past. These small, raised, red spots on his body grew in size on 6/22. The rash was never on palms and soles. They then went away on their own and were mostly resolved by 6/24. He thought his symptoms were related to covid-19, so he ended up having two covid tests and a video visit with his PCP. Both covid tests were negative. Despite his fevers and rash resolving, he continued to have malaise and body stiffness. Specifically, he had stiffness in his calves. He also describes sleeping a little more than usual and feeling more tired when he does his " "usual work around the house. On , he developed a mildly elevated temperature to 99F and his wife noted he began to look \"splotchy\" again. Then on 7/3 he began to experience intermittent lightheadedness. On  the lightheadedness continued and he also noted feeling his \"heart beat in his neck.\" He monitored his HR with a smart watch and noted it to be in the 50-60's, which is normal for him. On  the lightheadedness continued to the point that he felt like he may pass out, so he decided to present to urgent care. While there he was noted to have a HR in the 20's and had high grade AV block, so he was transferred to [facility]. He underwent urgent pacemaker placement, and his lightheadedness has resolved.     8 days prior to the start of symptoms (around ), he was [place] near the [facility] in [place]. Early in the trip, he found a deer tick on his lower back, which he removed. He and his family did regular tick checks while on the trip, so the deer tick could have only been attached for a few hours. He did not develop a rash at the site of the tick bite. He has never been bitten by a deer tick before, but has had wood tick bites.     Sick contacts: None. His wife and two children (12 and 14) have not been sick.  Pets: Two dogs, a bearded dragon, and snails. Did have pet rats, but last one  ~6mo ago. No recent contact with livestock.  Travel: Camping in [place] (noted above). In the last year travelled to [place] and [place]. In the last 5 years has also travelled to [place] and other trips to [place]. While travelling he denies eating any unusual foods.   Diet: Does not eat any unprocessed foods. No recent GI symptoms. Recently started eating foods from a farm share.     Allergy History: Has had allergies his whole life. These allergies has evolved throughout his life. When he was young, he had bad allergies to trees. Also had allergies to " strawberries and cashews. As he got older, he developed allergies to other nuts, but the cashew allergy resolved. He also had frequent hospitalizations for asthma as a child, but his asthma has largely resolved. He last refilled his rescue inhaler prescription 6 years ago. Most notably, about 10 years ago, he had an allergic reaction to amoxicillin. He was about 4 days into the course of the amoxicillin when he developed head to toe hives. He also developed some throat swelling, and was treated with 7 days of high dose steroids and benadryl. Since then he has avoided penicillins and blue cheese, which also gives him a milder reaction.     Since admission and treatment with doxycycline, he feels significantly improved.            Review of Systems:   CONSTITUTIONAL:  +fevers, chills, malaise, lightheadedness  EYES: negative for icterus  RESPIRATORY:  negative for cough with sputum and dyspnea  CARDIOVASCULAR:  negative for chest pain, dyspnea  GASTROINTESTINAL:  negative for nausea, vomiting, diarrhea and constipation  GENITOURINARY:  negative for dysuria  HEME:  No easy bruising  INTEGUMENT:  +rash  NEURO:  +mild headaches         Past Medical History:     Past Medical History:   Diagnosis Date     Kidney stone 3/12/2013            Past Surgical History:     Past Surgical History:   Procedure Laterality Date     GENITOURINARY SURGERY  vasectomy            Family History:   Reviewed and non-contributory.   Family History   Problem Relation Age of Onset     Anxiety Disorder Mother         Hx of Zoloft, now on Lexapro     Thyroid Disease Mother      Heart Defect Father         Replaced heart valve     Anxiety Disorder Brother         On Lexapro     Melanoma Brother      Hypertension Maternal Grandfather      Asthma Paternal Grandfather             Social History:     Social History     Tobacco Use     Smoking status: Former Smoker     Smokeless tobacco: Never Used     Tobacco comment: Quit at age 23 yo, periodic use  until age 26   Substance Use Topics     Alcohol use: Yes     Alcohol/week: 2.5 standard drinks     Types: 3 Cans of beer per week     Comment: ocassional     History   Sexual Activity     Sexual activity: Yes            Current Medications:       doxycycline (VIBRAMYCIN) IV  100 mg Intravenous Q12H     escitalopram  15 mg Oral Daily     sodium chloride (PF)  3 mL Intracatheter Q8H            Allergies:     Allergies   Allergen Reactions     Penicillins Anaphylaxis and Hives     Nuts Swelling     06/22/20 per patient - certain nuts cause odd sensation in throat which resolves within 30 minutes without action. These nuts include: hazelnut, almond, brazil, pistachio, macadamia. Eats without problems: peanuts, cashews, pecans, walnuts.       Amoxicillin      No Clinical Screening - See Comments      Blue cheese     Red Wine Complex [Red Wine Powder]      Mucous & congestion            Physical Exam:   Vitals were reviewed  Patient Vitals for the past 24 hrs:   BP Temp Temp src Pulse Heart Rate Resp SpO2 Weight   07/06/20 1211 111/77 97.6  F (36.4  C) Oral -- 59 16 97 % --   07/06/20 0827 100/72 97.6  F (36.4  C) Oral -- 59 16 96 % --   07/06/20 0608 99/67 97.6  F (36.4  C) Oral 61 60 16 96 % --   07/06/20 0541 -- -- -- -- -- -- -- 92.4 kg (203 lb 12.8 oz)   07/06/20 0400 -- -- -- -- 60 16 -- --   07/05/20 2335 110/70 98.6  F (37  C) Oral 60 60 16 96 % --   07/05/20 2011 105/64 99.1  F (37.3  C) Oral 62 -- 16 95 % --   07/05/20 1448 132/79 98  F (36.7  C) Oral -- -- 14 98 % --   07/05/20 1315 119/70 -- -- -- (!) 29 13 98 % --   07/05/20 1300 126/67 -- -- (!) 32 (!) 31 21 98 % --   07/05/20 1246 -- -- -- -- (!) 29 14 99 % --   07/05/20 1245 122/74 -- -- -- (!) 30 -- 96 % --       Physical Examination:  GENERAL:  well-developed, well-nourished, in bed in no acute distress.   HEENT:  Head is normocephalic, atraumatic   EYES:  Eyes have anicteric sclerae without conjunctival injection or stigmata of endocarditis.    ENT:   Oropharynx is moist without exudates or ulcers. Tongue is midline  NECK:  Supple. No cervical lymphadenopathy. Pacemaker in place.   LUNGS:  Clear to auscultation bilateral.   CARDIOVASCULAR:  Regular rate and rhythm with no murmurs, gallops or rubs.  ABDOMEN:  Normal bowel sounds, soft, nontender. No appreciable hepatosplenomegaly.  SKIN:  No acute rashes.  Very faint red spots on back and left thigh.   NEUROLOGIC:  Grossly nonfocal. Active x4 extremities         Laboratory Data:     Inflammatory Markers  No lab results found.    Hematology Studies    Recent Labs   Lab Test 07/06/20  0547 07/05/20  1146 06/20/18  0847   WBC 5.8 6.7 3.4*   ANEU  --  3.5  --    AEOS  --  0.1  --    HGB 11.5* 12.2* 13.7   MCV 93 93 88.9    344 209       Metabolic Studies     Recent Labs   Lab Test 07/05/20  1146 06/20/18  0936    142   POTASSIUM 4.4 4.4   CHLORIDE 106 101   CO2 28  --    BUN 23 18  19   CR 1.05 0.97   GFRESTIMATED 86  --        Hepatic Studies    Recent Labs   Lab Test 07/05/20  1146   BILITOTAL 0.4   ALKPHOS 80   ALBUMIN 3.4   AST 18   ALT 34         Lyme Ab 7.98

## 2020-07-07 LAB
HCV AB SERPL QL IA: NONREACTIVE
HIV 1+2 AB+HIV1 P24 AG SERPL QL IA: NONREACTIVE
SARS-COV-2 RNA SPEC QL NAA+PROBE: NOT DETECTED
SPECIMEN SOURCE: NORMAL

## 2020-07-07 PROCEDURE — 21400000 ZZH R&B CCU UMMC

## 2020-07-07 PROCEDURE — 25000132 ZZH RX MED GY IP 250 OP 250 PS 637: Performed by: STUDENT IN AN ORGANIZED HEALTH CARE EDUCATION/TRAINING PROGRAM

## 2020-07-07 PROCEDURE — 25000125 ZZHC RX 250: Performed by: EMERGENCY MEDICINE

## 2020-07-07 PROCEDURE — 25000128 H RX IP 250 OP 636: Performed by: STUDENT IN AN ORGANIZED HEALTH CARE EDUCATION/TRAINING PROGRAM

## 2020-07-07 PROCEDURE — 99232 SBSQ HOSP IP/OBS MODERATE 35: CPT | Mod: CS | Performed by: INTERNAL MEDICINE

## 2020-07-07 PROCEDURE — U0003 INFECTIOUS AGENT DETECTION BY NUCLEIC ACID (DNA OR RNA); SEVERE ACUTE RESPIRATORY SYNDROME CORONAVIRUS 2 (SARS-COV-2) (CORONAVIRUS DISEASE [COVID-19]), AMPLIFIED PROBE TECHNIQUE, MAKING USE OF HIGH THROUGHPUT TECHNOLOGIES AS DESCRIBED BY CMS-2020-01-R: HCPCS | Performed by: STUDENT IN AN ORGANIZED HEALTH CARE EDUCATION/TRAINING PROGRAM

## 2020-07-07 RX ORDER — CEFTRIAXONE 2 G/1
2 INJECTION, POWDER, FOR SOLUTION INTRAMUSCULAR; INTRAVENOUS EVERY 24 HOURS
Status: DISCONTINUED | OUTPATIENT
Start: 2020-07-07 | End: 2020-07-09

## 2020-07-07 RX ADMIN — ACETAMINOPHEN 650 MG: 325 TABLET, FILM COATED ORAL at 22:43

## 2020-07-07 RX ADMIN — CEFTRIAXONE SODIUM 2 G: 2 INJECTION, POWDER, FOR SOLUTION INTRAMUSCULAR; INTRAVENOUS at 18:35

## 2020-07-07 RX ADMIN — DOXYCYCLINE 100 MG: 100 INJECTION, POWDER, LYOPHILIZED, FOR SOLUTION INTRAVENOUS at 12:12

## 2020-07-07 RX ADMIN — ESCITALOPRAM 15 MG: 5 TABLET, FILM COATED ORAL at 07:34

## 2020-07-07 RX ADMIN — DOXYCYCLINE 100 MG: 100 INJECTION, POWDER, LYOPHILIZED, FOR SOLUTION INTRAVENOUS at 01:32

## 2020-07-07 RX ADMIN — ACETAMINOPHEN 650 MG: 325 TABLET, FILM COATED ORAL at 07:34

## 2020-07-07 ASSESSMENT — ACTIVITIES OF DAILY LIVING (ADL)
ADLS_ACUITY_SCORE: 13

## 2020-07-07 ASSESSMENT — PAIN DESCRIPTION - DESCRIPTORS
DESCRIPTORS: HEADACHE

## 2020-07-07 ASSESSMENT — MIFFLIN-ST. JEOR: SCORE: 1934.5

## 2020-07-07 NOTE — PROGRESS NOTES
Transfer  Transferred from:  ICU  Via:wheelchair  Reason for transfer: Pt inappropriate for unit  Family: Per pt will tell wife of transfer  Belongings:Sent with pt - refer to NA note   Chart:Sent with pt  Medications: Meds from bin sent with pt  Report called from: GABRIELA Segovia    Pt alert and oriented x4. VSS. Temp pacer at the bedside, sensing and occasionally pacing. R venous sheath site WNL, infusing TKO. L PIV also WNL, SL. Personal belongings set at the bedside. 2 RN skin assessment done by writer GABRIELA BANUELOS, RN

## 2020-07-07 NOTE — PROGRESS NOTES
Patient Transferred from 4E to 6C     Patient belongings:  - phone  - glasses  - phone   - shoes  - clothes  - headphones  - wedding ring

## 2020-07-07 NOTE — PLAN OF CARE
Major Shift Events:  Temp pacer in place and pacing at 60 BPM VVI. Occasional intrinsic rhythm on monitor SR 60's w/ first degree AVB. Breathing WNL on room, no signs of allergic reaction from abx.  A/Ox4, slept well between cares, afebrile, adequate UOP.     Plan: Tx orders present for 6C.     For vital signs and complete assessments, please see documentation flowsheets.

## 2020-07-07 NOTE — PROGRESS NOTES
Transfer  Transferred to:  Via:wheelhair   Reason for transfer:Pt appropriate for  6C  Family: Aware of transfer  Belongings:Sent with pt  Medications: Meds from bin sent with pt  Report called to: GABRIELA Draper     Ox4. Headache w/no relief from Tylenol. Temp pacemaker rate down to 50.Sinus in 50s underlying coming back with rare pacing in afternoon. BM+.Good appetite. No c/o symptoms w/HR changes.

## 2020-07-07 NOTE — PROGRESS NOTES
Cardiology Progress Note  7/7/2020      Assessment:  Mr. Chente Cerna is a 44 yom with a pmhx significant for anxiety presenting with high grade AV block and symptomatic bradycardia in the setting of multiple recent tick bites, febrile illness, and scattered polyarthralgia's concerning for lyme carditis.      Plan:  #. High grade atrioventricular block  #. Symptomatic bradycardia (vent rate 20s)  Likely secondary to lyme carditis. Seen by EP in ED, temporary pacer wire placed 7/5/20. No personal of family hx of conduction disease. Intrinsic rhythm becoming apparent 7/7 on tele.  -- EP following, appreciate recommendations  -- VVI 60 with pacing output 5 mA (capture threshold <2 mA) at outset:   -- VVI turned down to 50 on 7/7/20     #. Lyme carditis  #. Febrile illness  Recent history of multiple tick bites, dermatologic manifestations, polyarthalgias, fevers now with positive Lyme serology. Will require IV antibiotics until block resolves, then 3-4 weeks of PO completion. Negative COVID-19 x 2 in June 2020. RVP negative, repeat COVID-19 pending. HIV, Hep C negative.   -- reflex lyme western blot pending (7.98 lyme Ab)  -- broad tick borne illness serologies pending  -- ID consult, appreciate recommendations   -- passed ceftriaxone challenge in ICU, will continue with IV 2 gm q 24 hrs until heart block resolves, then transition to PO doxycyline for 14-21 days.      #. Penicillin anaphylaxis   Prev anaphylaxis to penicilline, patient passed ceftriaxone challenge with ICU monitoring overnight 7/6-7/7, will transfer back to floor and proceed with ceftriaxone treatment.   -- transfer back to floor, ceftriaxone to begin (2gm q 24 hrs)     #. Normocytic anemia  #. Mild iron deficiency   Hgb 12.2 on admission. Last 13.7 in 6/2018. No s/sx of bleeding. Mild REBECCA on iron panel.  -- encouraged iron rich diet and follow up with PCP at discharge    ================  #. Chronic Problems  # Generalized anxiety: continue  "escitalopram 15 mg q day     FEN: Regular diet, ambulation, PO fluids  Prophylaxis: Ambulation   Consults: ID, EP, Pharmacy     Code Status: FULL (confirmed with patient on  at admit)     Disposition: 6C tele (once back from )    Patient seen and discussed with Dr. Doll, who agrees with above plan.     Zak Hahn MD  Internal Medicine PGY-3  [phone]  ===================================================================    SUBJECTIVE: VIC o/n. Some mild lightheadedness with walking today, otherwise asymptomatic. Native pacer with some break through beats overnight. Will transfer back to floor today, passed ceftriaxone challenge without issue. Denies CP, SOB, HA, fevers, chills. Nursing notes reviewed.    OBJECTIVE:  /79   Pulse 60   Temp 98.3  F (36.8  C) (Oral)   Resp 14   Ht 1.93 m (6' 4\")   Wt 93.8 kg (206 lb 12.7 oz)   SpO2 98%   BMI 25.17 kg/m    Temp (24hrs), Av.4  F (36.9  C), Min:97.6  F (36.4  C), Max:99.2  F (37.3  C)      I/O:    Intake/Output Summary (Last 24 hours) at 2020 1127  Last data filed at 2020 1059  Gross per 24 hour   Intake 1773 ml   Output 1800 ml   Net -27 ml       Wt:   Wt Readings from Last 5 Encounters:   20 93.8 kg (206 lb 12.7 oz)   20 88 kg (194 lb)   19 88.5 kg (195 lb)   18 85.3 kg (188 lb)   18 85.9 kg (189 lb 6.4 oz)       GEN: pleasant, no acute distress  HEENT: no icterus  SKIN: pacer wire site on right neck C/D/I  CV: RRR, normal s1/s2, no murmurs/rubs/s3/s4, no heave. .   CHEST: clear to ausculation bilaterally, no rales or wheezing  ABD: soft, non-tender, normal active bowel sounds  EXTR: pulses intact. No clubbing, cyanosis or edema.   NEURO: alert oriented, speech fluent/appropriate, motor grossly nonfocal    Labs: reviewed in EMR  CBC  Recent Labs   Lab 20  0547 20  1146   WBC 5.8 6.7   RBC 3.99* 4.21*   HGB 11.5* 12.2*   HCT 37.1* 39.3*   MCV 93 93   MCH 28.8 29.0   MCHC 31.0* 31.0*   RDW 12.5 " 12.5    344     BMP  Recent Labs   Lab 07/05/20  1146      POTASSIUM 4.4   CHLORIDE 106   CO2 28   ANIONGAP 4   GLC 94   BUN 23   CR 1.05   GFRESTIMATED 86   GFRESTBLACK >90   MADELYN 9.1     Troponins:     Lab Results   Component Value Date    TROPI <0.015 07/05/2020     INRNo lab results found in last 7 days.    EKG  Bradycardia with high degree AV block prior to pacer placement on 7/5/20    Echocardiogram:  N/A    Coronary Angiogram:  N/A    Imaging: reviewed in EMR.

## 2020-07-07 NOTE — PROGRESS NOTES
"    General Infectious Disease Inpatient Progress note      Chente Cerna MRN# [id number]   YOB: 1975 Age: 44 year old   Date of Admission: 7/5/2020 11:34 AM  Date of visit: 07/07/2020          Assessment and Plan:     ASSESSMENT:  1. 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 Penicillin allergy     DISCUSSION:   Mr Cerna is a 44 year old male with a history of penicillin allergy, who presents with high degree AV block. His presentation is notable for deer tick bite, myalgias, and fevers. Lyme Ab positive, western blot pending. Thus would treat for lyme carditis with Ceftriaxone. Tolerated ceftriaxone challenge without issue. I anticipate his treatment to be 14 to 21 days.      The differential for infectious carditis also includes viral myocarditis, and rheumatic heart disease 2/2 GAS. Group A strep is lower down on the differential given his lack of sick contacts. The history of fever and rash could be consistent with an acute HIV or Hepatitis C infection; however HIV and Hep C returned negative. RVP also returned negative.      RECOMMENDATION:  1. Continue Ceftriaxone 2g IV q24h for presumed lyme carditis. Will transition to PO regimen, likely doxycycline, once AV block is improved/resolved per Cardiology.     Thank you for this consult. ID will continue to follow.     Patient was discussed with Dr. Manuel.      Patricia Mcdaniel MD   PGY-2, Internal Medicine-Pediatrics   Pager: [phone]  07/07/2020           Interim History and Events:     Tolerated the ceftriaxone challenge without anaphylaxis or any allergic type symptoms. Overall, feels better, but did develop some mild lightheadedness after walking the halls with the RN while wearing a mask. Feels like he can't quite take as deep of a breath as usual. States that this symptom is very mild and not anywhere close to his previous asthma exacerbations. Reports intermittent \"funny\" heart beats, but denies chest pain or prolonged palpitations. "     ROS:  Constitutional: no fever, no chills, no weight loss, no night sweats.   Neuro: no HA, No focal weakness  CVS: +palpitations, presyncope  Lungs: no dyspnea, no chest pain, no cough.   GI: no N/V, no diarrhea, no constipation, no abdominal pain  Skin: no rash, no Itching, no abscesses  Allergy/immunology: no allergic reaction   Musculoskeletal: no muscle pain, no joint pain, no joint swelling or limited ROM              Pysical Examination:  Temp: 98  F (36.7  C) Temp src: Oral BP: 122/79 Pulse: 59 Heart Rate: 60 Resp: 15 SpO2: 100 % O2 Device: None (Room air)      Vitals:    07/05/20 1144 07/06/20 0541 07/07/20 0500   Weight: 88 kg (194 lb) 92.4 kg (203 lb 12.8 oz) 93.8 kg (206 lb 12.7 oz)       Constitutional: awake, alert, cooperative, no apparent distress and appears at stated age, well nourished. Sitting in chair at bedside.   Head, ENT, Eyes, and Neck: Normocephalic, external ears without lesions, moist mucosa, EOMI, pink conjunctivae, non-icteric sclera. Neck supple without rigidity.   Lungs: CTA bilaterally, no accessory muscle use, normal work of breathing  CVS: RRR, normal S1/S2, no murmur, PMI was not displaced.   Abdomen: non-tender, non-distended  Extremities: no pitting edema of bilateral lower extremities, no ulcers, normal ROM of all joints, no swelling or erythema of any of joints and no tenderness to palpation.   Skin: no rash noted on exam today    Medications:    cefTRIAXone  2 g Intravenous Q24H     doxycycline (VIBRAMYCIN) IV  100 mg Intravenous Q12H     escitalopram  15 mg Oral Daily     sodium chloride (PF)  3 mL Intracatheter Q8H       Infusions/Drips:    - MEDICATION INSTRUCTIONS -       - MEDICATION INSTRUCTIONS -         Laboratory Data:     Inflammatory Markers    No lab results found.    Metabolic Studies       Recent Labs   Lab Test 07/05/20  1146 06/20/18  0936    142   POTASSIUM 4.4 4.4   CHLORIDE 106 101   CO2 28  --    ANIONGAP 4  --    BUN 23 18  19   CR 1.05 0.97    GFRESTIMATED 86  --    GLC 94 91   MADELYN 9.1 9.5       Hepatic Studies    Recent Labs   Lab Test 07/05/20  1146   BILITOTAL 0.4   ALKPHOS 80   ALBUMIN 3.4   AST 18   ALT 34       Hematology Studies      Recent Labs   Lab Test 07/06/20  0547 07/05/20  1146 06/20/18  0847   WBC 5.8 6.7 3.4*   ANEU  --  3.5  --    ALYM  --  2.1  --    KAYODE  --  1.0  --    AEOS  --  0.1  --    HGB 11.5* 12.2* 13.7   HCT 37.1* 39.3* 41.5    344 209       Microbiology:  Covid-19: negative (6/24), pending (7/7)  RVP: negative (7/6)  HIV: nonreactive  Hepatitis C ab: nonreactive  Babesia: pending  Erlichia: pending  Lyme Ab: 7.98  Lyme Western Blot: pending    Imaging:  No imaging.    ECHO:  No imaging.

## 2020-07-08 LAB
B BURGDOR IGG SER QL IB: NEGATIVE
B BURGDOR IGM SER QL IB: POSITIVE
B MICROTI IGM TITR SER: NORMAL {TITER}
INTERPRETATION ECG - MUSE: NORMAL

## 2020-07-08 PROCEDURE — 93005 ELECTROCARDIOGRAM TRACING: CPT

## 2020-07-08 PROCEDURE — 93010 ELECTROCARDIOGRAM REPORT: CPT | Performed by: INTERNAL MEDICINE

## 2020-07-08 PROCEDURE — 99232 SBSQ HOSP IP/OBS MODERATE 35: CPT | Mod: GC | Performed by: INTERNAL MEDICINE

## 2020-07-08 PROCEDURE — 25000132 ZZH RX MED GY IP 250 OP 250 PS 637: Performed by: STUDENT IN AN ORGANIZED HEALTH CARE EDUCATION/TRAINING PROGRAM

## 2020-07-08 PROCEDURE — 25000128 H RX IP 250 OP 636: Performed by: STUDENT IN AN ORGANIZED HEALTH CARE EDUCATION/TRAINING PROGRAM

## 2020-07-08 PROCEDURE — 21400000 ZZH R&B CCU UMMC

## 2020-07-08 RX ADMIN — ACETAMINOPHEN 650 MG: 325 TABLET, FILM COATED ORAL at 12:53

## 2020-07-08 RX ADMIN — CEFTRIAXONE SODIUM 2 G: 2 INJECTION, POWDER, FOR SOLUTION INTRAMUSCULAR; INTRAVENOUS at 18:05

## 2020-07-08 RX ADMIN — ESCITALOPRAM 15 MG: 5 TABLET, FILM COATED ORAL at 08:02

## 2020-07-08 ASSESSMENT — PAIN DESCRIPTION - DESCRIPTORS: DESCRIPTORS: HEADACHE

## 2020-07-08 ASSESSMENT — MIFFLIN-ST. JEOR: SCORE: 1928.64

## 2020-07-08 ASSESSMENT — ACTIVITIES OF DAILY LIVING (ADL)
ADLS_ACUITY_SCORE: 11
ADLS_ACUITY_SCORE: 13
ADLS_ACUITY_SCORE: 11

## 2020-07-08 NOTE — PLAN OF CARE
D: Pt who presents this admission with high grade AV block and symptomatic bradycardia in the setting of multiple recent tick bites, febrile illness, and scattered polyarthralgia's concerning for lyme carditis. Hx of anxiety.     I/A: Pt alert and oriented x4. Pt has been sinus rhythm with HRs 60-70s. Temp pacemaker still in place and rarely pacing. Setting at VVI 50 bpm. Pt on RA with O2 sats >92%. Pt reported a headache this evening, PRN tylenol given. Appetite good, denies nausea. Last BM this morning. Pt voiding. Up independently in room. IV abx given as scheduled.     P: Continue course of IV abx. Continue to monitor and assess pt with every encounter. Notify Cards 1 with any changes or concerns.

## 2020-07-08 NOTE — PLAN OF CARE
D: Pt admitted 7/5 with high grade AV block and symptomatic bradycardia in the setting of multiple recent tick bites, febrile illness, and scattered polyarthralgia's concerning for lyme carditis. History of anxiety.      I/A: Pt alert and oriented x4. Pt has been sinus rhythm with HRs 60-70s. Temp pacemaker still in place and rarely pacing. Setting at VVI 40 bpm. Pt on RA with O2 sats >92%. Pt reported a headache this afternoon, PRN tylenol given. Appetite good, denies nausea. Last BM yesterday. Pt voiding. Up independently in halls.     P: Continue course of IV abx. Continue to monitor and assess pt. Notify Cards 1 with any changes or concerns.

## 2020-07-08 NOTE — PROGRESS NOTES
Cardiology Progress Note  7/8/2020      Assessment:  Mr. Chente Cerna is a 44 yom with a pmhx significant for anxiety presenting with high grade AV block and symptomatic bradycardia in the setting of multiple recent tick bites, febrile illness, and scattered polyarthralgia's concerning for lyme carditis.      Plan:  #. High grade atrioventricular block, imrpoving  #. Symptomatic bradycardia (vent rate 20s on admission)  Likely secondary to lyme carditis. Seen by EP in ED, temporary pacer wire placed 7/5/20. No personal or family hx of conduction disease. Intrinsic rhythm and rate overtaking pacer from 7/7 onward, repeat EKG with 1st degree AV block on 7/8/20.  -- VVI 60 with pacing output 5 mA (capture threshold <2 mA) at outset:   -- VVI turned down to 50 on 7/7/20              -- VVI turned downt to 40 on 7/8/20              -- will likely pull pacer in AM of 7/9/20 after f/u EKG and tele monitoring overnight      #. Lyme carditis  #. Febrile illness  Recent history of multiple tick bites, dermatologic manifestations, polyarthalgias, fevers now with positive Lyme serology. Will require IV antibiotics until block resolves, then 3-4 weeks of PO completion. Negative COVID-19 x 2 in June 2020. RVP negative, repeat COVID-19 pending. HIV, Hep C negative.   -- reflex confirmatory lyme western blot pending (7.98 lyme Ab)  -- broad tick borne illness serologies pending  -- ID consult, appreciate recommendations   -- passed ceftriaxone challenge in ICU, will continue with IV 2 gm q 24 hrs until heart block resolves, then transition to PO doxycyline for 14-21 days.      #. Penicillin anaphylaxis   Prev anaphylaxis to penicilline, patient passed ceftriaxone challenge with ICU monitoring overnight 7/6-7/7, will transfer back to floor and proceed with ceftriaxone treatment.   -- transfer back to floor, ceftriaxone to begin (2gm q 24 hrs)     #. Normocytic anemia  #. Mild iron deficiency   Hgb 12.2 on admission. Last 13.7 in  "2018. No s/sx of bleeding. Mild REBECCA on iron panel.  -- encouraged iron rich diet and follow up with PCP at discharge    ================  #. Chronic Problems  # Generalized anxiety: continue escitalopram 15 mg q day     FEN: Regular diet, ambulation, PO fluids  Prophylaxis: Ambulation   Consults: ID, EP, Pharmacy     Code Status: FULL (confirmed with patient on  at admit)     Disposition: 6C tele, likely discharge at some point 20    Patient seen and discussed with Dr. Doll, who agrees with above plan.     Zak Hahn MD  Internal Medicine PGY-3  [phone]  ===================================================================    SUBJECTIVE: VIC o/n. No complaints this AM. \"Felt\" the pacer go off only once in the last 24 hours. Turned rate threshold down to 40 today. Has been reading about heart block on his own time. Denies CP, SOB, HA, cough, fever. Understands plan of care and all questions answered.     OBJECTIVE:  /78 (BP Location: Right arm)   Pulse 59   Temp 97.8  F (36.6  C) (Oral)   Resp 18   Ht 1.93 m (6' 4\")   Wt 93.2 kg (205 lb 8 oz)   SpO2 97%   BMI 25.01 kg/m    Temp (24hrs), Av.4  F (36.9  C), Min:97.6  F (36.4  C), Max:99.2  F (37.3  C)      I/O:    Intake/Output Summary (Last 24 hours) at 2020 1127  Last data filed at 2020 1059  Gross per 24 hour   Intake 1773 ml   Output 1800 ml   Net -27 ml       Wt:   Wt Readings from Last 5 Encounters:   20 93.2 kg (205 lb 8 oz)   20 88 kg (194 lb)   19 88.5 kg (195 lb)   18 85.3 kg (188 lb)   18 85.9 kg (189 lb 6.4 oz)       GEN: pleasant, sitting up in chair, no acute distress  HEENT: no icterus  SKIN: pacer wire site on right neck C/D/I  CV: RRR, normal s1/s2, no murmurs/rubs/s3/s4, no heave.    CHEST: clear to ausculation bilaterally, no rales or wheezing  ABD: soft, non-tender, normal active bowel sounds  EXTR: pulses intact. No clubbing, cyanosis or edema.   NEURO: alert oriented, speech " fluent/appropriate, motor grossly nonfocal    Labs: reviewed in EMR  CBC  Recent Labs   Lab 07/06/20  0547 07/05/20  1146   WBC 5.8 6.7   RBC 3.99* 4.21*   HGB 11.5* 12.2*   HCT 37.1* 39.3*   MCV 93 93   MCH 28.8 29.0   MCHC 31.0* 31.0*   RDW 12.5 12.5    344     BMP  Recent Labs   Lab 07/05/20  1146      POTASSIUM 4.4   CHLORIDE 106   CO2 28   ANIONGAP 4   GLC 94   BUN 23   CR 1.05   GFRESTIMATED 86   GFRESTBLACK >90   MADELYN 9.1     Troponins:     Lab Results   Component Value Date    TROPI <0.015 07/05/2020     INRNo lab results found in last 7 days.    EKG  Bradycardia with high degree AV block prior to pacer placement on 7/5/20    Repeat 7/8/20: 1st degree AV block, Vent rate 59      Echocardiogram:  N/A    Coronary Angiogram:  N/A    Imaging: reviewed in EMR.

## 2020-07-08 NOTE — PLAN OF CARE
Neuro: A&Ox4.   Cardiac: VSS. SR 60-70's. Temporary pacer set at 50. No pacing noted.   Respiratory: Sating WNL on RA.  GI/: Adequate urine output.   Diet/appetite: Regular diet.  Activity:  Up to bathroom  independently.  Pain: Denies.  Skin: No new deficits noted.  LDA's: PIV NS locked.    Plan: Continue with POC. Notify primary team with changes.

## 2020-07-08 NOTE — PROGRESS NOTES
General Infectious Disease Inpatient Progress note      Chente Cerna MRN# [id number]   YOB: 1975 Age: 44 year old   Date of Admission: 7/5/2020 11:34 AM  Date of visit: 07/08/2020          Assessment and Plan:     ASSESSMENT:  1. 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 Penicillin allergy     DISCUSSION:   Mr Cerna is a 44 year old male with a history of penicillin allergy, who presents with high degree AV block. His presentation is notable for deer tick bite, myalgias, and fevers. Lyme Ab positive, western blot pending. Thus would treat for lyme carditis with Ceftriaxone. Tolerated ceftriaxone challenge without issue. I anticipate his treatment to be 14 to 21 days. We will continue IV therapy for now and then transition to PO. Patient's EKG today is improved compared to yesterday but still has prolonged [place] interval (334 ms) and QTc (489 ms).      The differential for infectious carditis also includes viral myocarditis, and rheumatic heart disease 2/2 GAS. Group A strep is lower down on the differential given his lack of sick contacts. The history of fever and rash could be consistent with an acute HIV or Hepatitis C infection; however HIV and Hep C returned negative. RVP also returned negative.      RECOMMENDATION:  1. Continue Ceftriaxone 2g IV q24h for presumed lyme carditis. Will transition to PO regimen, likely doxycycline, once AV block is improved/resolved per Cardiology.     Thank you for this consult. ID will continue to follow.     Patient was discussed with Dr. Piedad Manuel.      Chin Hutchinson MD  PGY-1, Internal Medicine   Pager: [phone]  07/08/2020           Interim History and Events:     Patient was afebrile and hemodynamically stable overnight. Reports feeling better today, walking around. He has a temp pacemaker in place but it is rarely pacing. His appetite is good and reports having a mild headache and some palpitations earlier today. He otherwise does not have any other  concerns or complaints at this time.      ROS:  Constitutional: no fever, no chills, no weight loss, no night sweats.   Neuro: no HA, No focal weakness  CVS: + palpitations, presyncope  Lungs: no dyspnea, no chest pain, no cough.   GI: no N/V, no diarrhea, no constipation, no abdominal pain  Skin: no rash, no Itching, no abscesses  Allergy/immunology: no allergic reaction   Neuro: + for mild headache  Musculoskeletal: no muscle pain, no joint pain, no joint swelling or limited ROM              Pysical Examination:  Temp: 98.4  F (36.9  C) Temp src: Oral BP: 127/68 Pulse: 59 Heart Rate: 69 Resp: 16 SpO2: 94 % O2 Device: None (Room air)      Vitals:    07/05/20 1144 07/06/20 0541 07/07/20 0500 07/08/20 0654   Weight: 88 kg (194 lb) 92.4 kg (203 lb 12.8 oz) 93.8 kg (206 lb 12.7 oz) 93.2 kg (205 lb 8 oz)       Constitutional: awake, alert, cooperative, no apparent distress and appears at stated age, well nourished.  Sitting up in bed.   Head, ENT, Eyes, and Neck: Normocephalic, external ears without lesions, moist mucosa, EOMI, pink conjunctivae, non-icteric sclera. Neck supple without rigidity. Temporary pacemaker in place on right.   Lungs: CTA bilaterally, no accessory muscle use, normal work of breathing  CVS: RRR, normal S1/S2, no murmur, PMI was not displaced.   Abdomen: non-tender, non-distended  Extremities: no pitting edema of bilateral lower extremities,  normal ROM of all joints,   Skin: no rash noted on exam today    Medications:    cefTRIAXone  2 g Intravenous Q24H     escitalopram  15 mg Oral Daily     sodium chloride (PF)  3 mL Intracatheter Q8H       Infusions/Drips:    - MEDICATION INSTRUCTIONS -       - MEDICATION INSTRUCTIONS -         Laboratory Data:     Inflammatory Markers    No lab results found.    Metabolic Studies       Recent Labs   Lab Test 07/05/20  1146 06/20/18  0936    142   POTASSIUM 4.4 4.4   CHLORIDE 106 101   CO2 28  --    ANIONGAP 4  --    BUN 23 18  19   CR 1.05 0.97    GFRESTIMATED 86  --    GLC 94 91   MADELYN 9.1 9.5       Hepatic Studies    Recent Labs   Lab Test 07/05/20  1146   BILITOTAL 0.4   ALKPHOS 80   ALBUMIN 3.4   AST 18   ALT 34       Hematology Studies      Recent Labs   Lab Test 07/06/20  0547 07/05/20  1146 06/20/18  0847   WBC 5.8 6.7 3.4*   ANEU  --  3.5  --    ALYM  --  2.1  --    KAYODE  --  1.0  --    AEOS  --  0.1  --    HGB 11.5* 12.2* 13.7   HCT 37.1* 39.3* 41.5    344 209       Microbiology:  Covid-19: negative (6/24), pending (7/7)  RVP: negative (7/6)  HIV: nonreactive  Hepatitis C ab: nonreactive  Babesia: pending  Erlichia: pending  Lyme Ab: 7.98  Lyme Western Blot: pending    Imaging:  No imaging.    ECHO:  No imaging.

## 2020-07-09 VITALS
SYSTOLIC BLOOD PRESSURE: 138 MMHG | BODY MASS INDEX: 24.88 KG/M2 | OXYGEN SATURATION: 95 % | HEART RATE: 56 BPM | RESPIRATION RATE: 16 BRPM | TEMPERATURE: 98.1 F | HEIGHT: 76 IN | DIASTOLIC BLOOD PRESSURE: 79 MMHG | WEIGHT: 204.3 LBS

## 2020-07-09 LAB
A PHAGOCYTOPH DNA BLD QL NAA+PROBE: NOT DETECTED
E CHAFFEENSIS DNA BLD QL NAA+PROBE: NOT DETECTED
E EWINGII DNA SPEC QL NAA+PROBE: NOT DETECTED
EHRLICHIA DNA SPEC QL NAA+PROBE: NOT DETECTED
INTERPRETATION ECG - MUSE: NORMAL

## 2020-07-09 PROCEDURE — 25000132 ZZH RX MED GY IP 250 OP 250 PS 637: Performed by: STUDENT IN AN ORGANIZED HEALTH CARE EDUCATION/TRAINING PROGRAM

## 2020-07-09 PROCEDURE — 93005 ELECTROCARDIOGRAM TRACING: CPT

## 2020-07-09 PROCEDURE — 93010 ELECTROCARDIOGRAM REPORT: CPT | Performed by: INTERNAL MEDICINE

## 2020-07-09 PROCEDURE — 25000128 H RX IP 250 OP 636: Performed by: STUDENT IN AN ORGANIZED HEALTH CARE EDUCATION/TRAINING PROGRAM

## 2020-07-09 PROCEDURE — 99239 HOSP IP/OBS DSCHRG MGMT >30: CPT | Mod: GC | Performed by: INTERNAL MEDICINE

## 2020-07-09 RX ORDER — DOXYCYCLINE HYCLATE 100 MG
100 TABLET ORAL 2 TIMES DAILY
Qty: 32 TABLET | Refills: 0 | Status: SHIPPED | OUTPATIENT
Start: 2020-07-09 | End: 2020-07-09

## 2020-07-09 RX ORDER — CEFTRIAXONE 2 G/1
2 INJECTION, POWDER, FOR SOLUTION INTRAMUSCULAR; INTRAVENOUS EVERY 24 HOURS
Status: DISCONTINUED | OUTPATIENT
Start: 2020-07-09 | End: 2020-07-09 | Stop reason: HOSPADM

## 2020-07-09 RX ORDER — DOXYCYCLINE HYCLATE 100 MG
100 TABLET ORAL 2 TIMES DAILY
Qty: 36 TABLET | Refills: 0 | Status: SHIPPED | OUTPATIENT
Start: 2020-07-09 | End: 2020-07-31

## 2020-07-09 RX ADMIN — ESCITALOPRAM 15 MG: 5 TABLET, FILM COATED ORAL at 07:42

## 2020-07-09 RX ADMIN — CEFTRIAXONE SODIUM 2 G: 2 INJECTION, POWDER, FOR SOLUTION INTRAMUSCULAR; INTRAVENOUS at 12:04

## 2020-07-09 RX ADMIN — ACETAMINOPHEN 650 MG: 325 TABLET, FILM COATED ORAL at 04:07

## 2020-07-09 ASSESSMENT — PAIN DESCRIPTION - DESCRIPTORS: DESCRIPTORS: DISCOMFORT

## 2020-07-09 ASSESSMENT — ACTIVITIES OF DAILY LIVING (ADL)
ADLS_ACUITY_SCORE: 11

## 2020-07-09 ASSESSMENT — MIFFLIN-ST. JEOR: SCORE: 1923.2

## 2020-07-09 NOTE — PLAN OF CARE
D: Admitted high grade AV block and symptomatic bradycardia in the setting of multiple recent tick bites, febrile illness, and scattered polyarthralgia's concerning for lyme carditis. Hx of anxiety.     I/A: A/Ox4. VSS on RA. SB/SR with 1*AVB on tele. Temp transvenous pacemaker in place, rarely pacing @40 BPM, VVI. Denies pain. Regular diet, no caffeine. Voiding adequately. Up ad jaida.    P: Continue to monitor and notify treatment team with changes/concerns.

## 2020-07-09 NOTE — PLAN OF CARE
D: Pt who presents this admission with high grade AV block and symptomatic bradycardia in the setting of multiple recent tick bites, febrile illness, and scattered polyarthralgia's concerning for lyme carditis. Hx of anxiety.      I/A: Pt alert and oriented x4. Pt SB/SR with HRs 50-75. Temp pacemaker still in place and rarely pacing. Setting at VVI 40 bpm. Pt on RA with O2 sats >92%. Denies any pain, lightheadedness or dizziness. Appetite good, denies nausea. Last BM yesterday. Pt voiding. Up independently in room. IV abx given as scheduled.      P: Temp pacemaker to be pulled tomorrow morning pending followup EKG and tele monitoring overnight. Pt to transition to PO doxy tomorrow. Possible discharge as well pending medically stability with plan. Continue to monitor and assess pt with every encounter. Notify Cards 1 with any changes or concerns.

## 2020-07-09 NOTE — PROGRESS NOTES
General Infectious Disease Inpatient Progress note      Chente Cerna MRN# [id number]   YOB: 1975 Age: 44 year old   Date of Admission: 7/5/2020 11:34 AM  Date of visit: 07/09/2020          Assessment and Plan:     ASSESSMENT:  1. 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 Penicillin allergy     DISCUSSION:   Mr Cerna is a 44 year old male with a history of penicillin allergy, who presents with high degree AV block. His presentation is notable for deer tick bite, myalgias, and fevers. Lyme Ab positive, western blot with positive IgM ab and negative IgG ab. These results are consistent with his time course of symptoms. He has received ceftriaxone since 7/6 for lyme carditis. His [place] interval continues to improve (274 on 7/9). Since Cardiology plans to remove the pacer today, he is safe to transition to PO therapy with doxycycline for a 21 day total course.      The differential for infectious carditis also includes viral myocarditis, and rheumatic heart disease 2/2 GAS. Group A strep is lower down on the differential given his lack of sick contacts. The history of fever and rash could be consistent with an acute HIV or Hepatitis C infection; however HIV and Hep C returned negative. RVP also returned negative. Babesia and Erlichia were negative.      RECOMMENDATION:  1. Transition to PO doxycycline on discharge. Plan for 21 day total course. Last day of therapy should be 7/27.    Thank you for this consult.     Patient was discussed with Dr. Piedad Manuel.      Patricia Mcdaniel MD  Medicine-Pediatrics, PGY-2  Pager [phone]  07/09/2020           Interim History and Events:     No acute events overnight. Continues to be afebrile and vitally stable. Feels to be at his baseline. Ambulates with ease around unit. Eager to go home and see his family.     ROS:  Constitutional: no fever, no chills, no weight loss, no night sweats.   Neuro: no HA, No focal weakness  CVS: + palpitations,  presyncope  Lungs: no dyspnea, no chest pain, no cough.   GI: no N/V, no diarrhea, no constipation, no abdominal pain  Skin: no rash, no Itching, no abscesses  Allergy/immunology: no allergic reaction   Neuro: + for mild headache  Musculoskeletal: no muscle pain, no joint pain, no joint swelling or limited ROM              Pysical Examination:  Temp: 98.1  F (36.7  C) Temp src: Oral BP: 138/79 Pulse: 56 Heart Rate: 61 Resp: 16 SpO2: 95 % O2 Device: None (Room air)      Vitals:    07/05/20 1144 07/06/20 0541 07/07/20 0500 07/08/20 0654   Weight: 88 kg (194 lb) 92.4 kg (203 lb 12.8 oz) 93.8 kg (206 lb 12.7 oz) 93.2 kg (205 lb 8 oz)    07/09/20 0400   Weight: 92.7 kg (204 lb 4.8 oz)       Constitutional: awake, alert, cooperative, no apparent distress and appears at stated age, well nourished.  Sitting in chair.   Head, ENT, Eyes, and Neck: Normocephalic, external ears without lesions, moist mucosa, EOMI, pink conjunctivae, non-icteric sclera. Neck supple without rigidity. Temporary pacemaker in place on right.   Lungs: CTA bilaterally, no accessory muscle use, normal work of breathing  CVS: RRR, normal S1/S2, no murmur  Abdomen: non-tender, non-distended  Extremities: no pitting edema of bilateral lower extremities,  normal ROM of all joints,   Skin: no rash noted on exam today    Medications:    cefTRIAXone  2 g Intravenous Q24H     escitalopram  15 mg Oral Daily     sodium chloride (PF)  3 mL Intracatheter Q8H       Infusions/Drips:    - MEDICATION INSTRUCTIONS -       - MEDICATION INSTRUCTIONS -         Laboratory Data:     Inflammatory Markers    No lab results found.    Metabolic Studies       Recent Labs   Lab Test 07/05/20  1146 06/20/18  0936    142   POTASSIUM 4.4 4.4   CHLORIDE 106 101   CO2 28  --    ANIONGAP 4  --    BUN 23 18  19   CR 1.05 0.97   GFRESTIMATED 86  --    GLC 94 91   MADELYN 9.1 9.5       Hepatic Studies    Recent Labs   Lab Test 07/05/20  1146   BILITOTAL 0.4   ALKPHOS 80   ALBUMIN 3.4    AST 18   ALT 34       Hematology Studies      Recent Labs   Lab Test 07/06/20  0547 07/05/20  1146 06/20/18  0847   WBC 5.8 6.7 3.4*   ANEU  --  3.5  --    ALYM  --  2.1  --    KAYODE  --  1.0  --    AEOS  --  0.1  --    HGB 11.5* 12.2* 13.7   HCT 37.1* 39.3* 41.5    344 209       Microbiology:  Covid-19: negative (6/24), negative(7/7)  RVP: negative (7/6)  HIV: nonreactive  Hepatitis C ab: nonreactive  Babesia: negative  Erlichia: negative  Lyme Ab: 7.98  Lyme Western Blot: IgM positive, IgG negative    Imaging:  No imaging.    ECHO:  No imaging.

## 2020-07-09 NOTE — PROGRESS NOTES
SPIRITUAL HEALTH SERVICES: Tele-Encounter  Patient Location ([facility], [facility], Unit): [facility] 6C  Spoke with (patient, family relationship): patient      Referral Source: self-identified due to length of stay    If applicable: patient was appropriately screened for telechaplaincy support with bedside nurse prior to visit (e.g. Mental Health and Addiction contexts). See call details below.    DATA:  -initated visit attempt via phone/in-person. Patient was not available/did not answer. [facility] will follow up as appropriate in consultation with patient's bedside nurse. Another progress note will be entered in the chart when/if I am able to make contact with the patient.         PLAN:  Continue to try to contact.      Domonique Hardy   Intern  Voicemail: [phone]  ______________________________    Type of service:  Telephone Visit     has received verbal consent for a TelephoneVisit from the patient? No    Distance Provider Location: designated [place] office or home office (secure setting)    Mode of Communication: telephone (via [employer] phone or [employer] tele-call-number ([phone]))    * [facility] remains available 24/7 for emergent requests/referrals, either by having the switchboard page the on-call  or by entering an ASAP/STAT consult in Epic (this will also page the on-call ). Routine Epic consults receive an initial response within 24 hours.*

## 2020-07-09 NOTE — PLAN OF CARE
DISCHARGE    Discharged to: Home  Via: Automobile  Accompanied by: Wife  Discharge Instructions: diet, activity, medications, follow up appointments, when to call the MD, and what to watchout for (i.e. s/s of infection, increasing SOB, palpitations, chest pain,)  Prescriptions: To be filled by dischargepharmacy per pt's request; medication list reviewed & sent with pt  Follow Up Appointments: arranged; information given  Belongings: All sent with pt  IV: Out  Telemetry: Off  Pt exhibits understanding of above discharge instructions; all questions answered  Discharge Paperwork: Signed

## 2020-07-09 NOTE — DISCHARGE SUMMARY
[address] [address]  p: [phone]    Discharge Summary: Cardiology Service    Chente Cerna MRN# [id number]   YOB: 1975 Age: 44 year old       Admission Date: 7/5/2020  Discharge Date: 07/09/20      Discharge Diagnoses:  1. High grade, complete heart block  2. Bradycardia, symptomatic  3. Lyme carditis  4. Iron deficiency anemia, mild    Pertinent Procedures:  1. Temporary transvenous pacer wire placement    Consults:  ID  Pharmacy    Imaging with results:  Echocardiogram: N/A    Coronary Angiogram N/A    Other imaging studies: N/A    Brief HPI:  Mr. Chente Cerna is a 44 yom with a pmhx significant for anxiety presenting with high grade AV block and symptomatic bradycardia in the setting of multiple recent tick bites, febrile illness, and scattered polyarthralgia's concerning for lyme carditis.     Hospital Course by Diagnosis:       Plan:  #. High grade atrioventricular block, improved to 1st degree AVB by time of admission   #. Symptomatic bradycardia (vent rate 20s on admission), resolved   Likely secondary to lyme carditis. Seen by EP in ED, temporary pacer wire placed 7/5/20. No personal or family hx of conduction disease. Intrinsic rhythm and rate overtaking pacer from 7/7 onward, repeat EKG with 1st degree AV block on 7/8/20. Pacer removed 7/8 without incident.   -- EP cardiology outpatient referral for 1 month, [facility]  -- EKG in two weeks with PCP, or whenever able, for review at above     #. Lyme carditis  #. Febrile illness  Recent history of multiple tick bites, dermatologic manifestations, polyarthalgias, fevers now with positive Lyme serology and positive confirmatory western blot for IgM. Will require IV antibiotics until block resolves, then 3-4 weeks of PO completion. Negative COVID-19 x 2 in June 2020. RVP negative, repeat COVID-19 pending. HIV, Hep C, babesia negative, anaplasma pending at time  of dischare.   -- s/p 5 days of IV antibiotics (doxycyline, then ceftriaxone)  -- will complete PO doxycyline at time of discharge, end date 7/27 per ID     #. Penicillin anaphylaxis   Prev anaphylaxis to penicillin, patient passed ceftriaxone challenge with ICU monitoring overnight 7/6-7/7, tolerated IV ceftriaxone treatment without issue.      #. Normocytic anemia  #. Mild iron deficiency   Hgb 12.2 on admission. Last 13.7 in 6/2018. No s/sx of bleeding. Mild REBECCA on iron panel.  -- encouraged iron rich diet and follow up with PCP at discharge     ================  #. Chronic Problems  # Generalized anxiety: continue escitalopram 15 mg q day    Condition on discharge  Temp:  [97.8  F (36.6  C)-98.4  F (36.9  C)] 98.4  F (36.9  C)  Pulse:  [56] 56  Heart Rate:  [50-69] 50  Resp:  [14-16] 14  BP: (117-130)/(63-77) 130/77  SpO2:  [94 %-97 %] 95 %     GEN: pleasant, sitting up in chair, no acute distress  HEENT: no icterus  SKIN: pacer site, C/D/I without bleeding  CV: RRR, normal s1/s2, no murmurs/rubs/s3/s4, no heave.    CHEST: clear to ausculation bilaterally, no rales or wheezing  ABD: soft, non-tender, normal active bowel sounds  EXTR: pulses intact. No clubbing, cyanosis or edema.   NEURO: alert oriented, speech fluent/appropriate, motor grossly nonfocal    Medication Changes:  -- START doxycyline 100 mg BID with end date of 7/27/20 (total 21 days of adequate antibiotics)    Discharge medications:   Current Discharge Medication List      CONTINUE these medications which have NOT CHANGED    Details   escitalopram (LEXAPRO) 10 MG tablet TAKE 1 AND 1/2 TABLETS BY  MOUTH DAILY  Qty: 135 tablet, Refills: 0    Associated Diagnoses: Anxiety; Family history of anxiety disorder      albuterol (PROAIR HFA, PROVENTIL HFA, VENTOLIN HFA) 108 (90 BASE) MCG/ACT inhaler Inhale 2 puffs into the lungs every 6 hours as needed for shortness of breath / dyspnea or wheezing  Qty: 1 Inhaler, Refills: 0    Associated Diagnoses: Acute  bronchitis      ibuprofen 200 MG capsule Take 200 mg by mouth every 4 hours as needed           EKG:    On admission:      On day of discharge:      Labs or imaging requiring follow-up after discharge:  -- EKG in 1-2 weeks to follow up 1st degree AVB for resolution      Follow-up:  -- PCP in 1 week for post-hospitalization f/u  -- EP cardiology outpatient referral for 1 month f/u    Code status:  FULL (confirmed on admission)    Patient Care Team:  Guerrero Iqbal MD as PCP - General (Family Practice)  Vishal Edmonds MD as Assigned PCP    Zak Hahn MD  Internal Medicine PGY-3  [phone]

## 2020-07-20 DIAGNOSIS — F41.9 ANXIETY: ICD-10-CM

## 2020-07-20 DIAGNOSIS — Z81.8 FAMILY HISTORY OF ANXIETY DISORDER: ICD-10-CM

## 2020-07-21 RX ORDER — ESCITALOPRAM OXALATE 10 MG/1
TABLET ORAL
Qty: 135 TABLET | Refills: 0 | Status: SHIPPED | OUTPATIENT
Start: 2020-07-21 | End: 2020-10-15

## 2020-07-22 PROBLEM — R76.8 POSITIVE LYME DISEASE SEROLOGY: Status: ACTIVE | Noted: 2020-07-22

## 2020-07-22 PROBLEM — A69.29 LYME CARDITIS: Status: ACTIVE | Noted: 2020-07-22

## 2020-07-24 ENCOUNTER — OFFICE VISIT (OUTPATIENT)
Dept: FAMILY MEDICINE | Facility: CLINIC | Age: 45
End: 2020-07-24

## 2020-07-24 VITALS
WEIGHT: 199.13 LBS | RESPIRATION RATE: 16 BRPM | BODY MASS INDEX: 24.25 KG/M2 | DIASTOLIC BLOOD PRESSURE: 80 MMHG | TEMPERATURE: 98.1 F | SYSTOLIC BLOOD PRESSURE: 130 MMHG | HEIGHT: 76 IN | HEART RATE: 74 BPM | OXYGEN SATURATION: 97 %

## 2020-07-24 DIAGNOSIS — I44.2 COMPLETE HEART BLOCK (H): Primary | ICD-10-CM

## 2020-07-24 DIAGNOSIS — A69.29 LYME CARDITIS: ICD-10-CM

## 2020-07-24 DIAGNOSIS — D50.8 IRON DEFICIENCY ANEMIA SECONDARY TO INADEQUATE DIETARY IRON INTAKE: ICD-10-CM

## 2020-07-24 PROCEDURE — 99495 TRANSJ CARE MGMT MOD F2F 14D: CPT | Mod: 25 | Performed by: FAMILY MEDICINE

## 2020-07-24 PROCEDURE — 93000 ELECTROCARDIOGRAM COMPLETE: CPT | Performed by: FAMILY MEDICINE

## 2020-07-24 ASSESSMENT — MIFFLIN-ST. JEOR: SCORE: 1899.73

## 2020-07-24 NOTE — PROGRESS NOTES
SUBJECTIVE:                                                      Patient presents for Hospital Followup Visit:    Hospital:  [facility]      Date of Admission: 07/05/20  Date of Discharge: 07/09/20  Reason(s) for Admission: High grade AV block, bradycardia, Lyme carditis, iron deficiency anemia            Problems taking medications regularly:  None       Medication changes since discharge: None       Problems adhering to non-medication therapy:  None    Summary of hospitalization:  [facility] discharge summary reviewed  Diagnostic Tests/Treatments reviewed.  Follow up needed: cardiology  Other Healthcare Providers Involved in Patient s Care:         cardiology  Update since discharge: improved.     HPI:    Otherwise healthy patient here for hospital follow up.  Admitted to hospital 7/5 - 7/9 with high grade AV block and lyme carditis with symptomatic bradycardia and HR in 20s.  Temporary pacer placed.   Was placed on IV doxycycline and then IV ceftriaxone.  Transitioned to PO doxy.  Heart block improved to mild 1st degree AV block at time of discharge.  Symptoms markedly improved.  COVID neg x 2.  Babesia and anaplasma negative.  Feels back to baseline.  No CP, SOB, significant fatigue.       ROS:  Constitutional, neuro, ENT, endocrine, pulmonary, cardiac, gastrointestinal, genitourinary, musculoskeletal, integument and psychiatric systems are negative, except as otherwise noted.    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  EKG personally reviewed.  AV block appears to have resolved.  Borderline criteria for bifascicular block but overall EKG appears improved from 7/9.  Cont plan to complete doxy and follow up with cardiology in 1 week.    Patient is agreement with the assessment and plan as outlined above.  All questions answered.  Red flag symptoms that should prompt emergent evaluation discussed and understood.      Post Discharge Medication Reconciliation: discharge medications reconciled, continue medications without change.  Issues to address: No issues identified.  Plan of care communicated with patient      Type of Medical Decision Making Face-to-Face Visit within 7 Days Face-to-Face Visit within 14 days   Moderate Complexity [id number]   High Complexity [id number]

## 2020-07-31 ENCOUNTER — OFFICE VISIT (OUTPATIENT)
Dept: CARDIOLOGY | Facility: CLINIC | Age: 45
End: 2020-07-31
Payer: COMMERCIAL

## 2020-07-31 VITALS
HEART RATE: 76 BPM | HEIGHT: 76 IN | WEIGHT: 200.5 LBS | BODY MASS INDEX: 24.42 KG/M2 | SYSTOLIC BLOOD PRESSURE: 117 MMHG | OXYGEN SATURATION: 97 % | DIASTOLIC BLOOD PRESSURE: 78 MMHG

## 2020-07-31 DIAGNOSIS — A69.20 LYME DISEASE: ICD-10-CM

## 2020-07-31 DIAGNOSIS — I44.2 COMPLETE HEART BLOCK (H): ICD-10-CM

## 2020-07-31 DIAGNOSIS — Z13.220 SCREENING FOR HYPERLIPIDEMIA: Primary | ICD-10-CM

## 2020-07-31 PROCEDURE — 99214 OFFICE O/P EST MOD 30 MIN: CPT | Performed by: INTERNAL MEDICINE

## 2020-07-31 ASSESSMENT — MIFFLIN-ST. JEOR: SCORE: 1905.71

## 2020-07-31 NOTE — PROGRESS NOTES
HPI and Plan:   See dictation [id number]    Orders Placed This Encounter   Procedures     Lipid Profile     CBC with platelets     Follow-Up with Cardiologist     Cardiac Event Monitor Adult Pediatric     Echocardiogram Complete     No orders of the defined types were placed in this encounter.    Medications Discontinued During This Encounter   Medication Reason     doxycycline hyclate (VIBRA-TABS) 100 MG tablet Medication Reconciliation Clean Up         Encounter Diagnoses   Name Primary?     Complete heart block (H)      Lyme disease      Screening for hyperlipidemia Yes       CURRENT MEDICATIONS:  Current Outpatient Medications   Medication Sig Dispense Refill     escitalopram (LEXAPRO) 10 MG tablet TAKE 1 AND 1/2 TABLETS BY  MOUTH DAILY 135 tablet 0       ALLERGIES     Allergies   Allergen Reactions     Penicillins Anaphylaxis and Hives     Tolerated desensitization to ceftriaxone 7/6 without any reaction     Nuts Swelling     06/22/20 per patient - certain nuts cause odd sensation in throat which resolves within 30 minutes without action. These nuts include: hazelnut, almond, brazil, pistachio, macadamia. Eats without problems: peanuts, cashews, pecans, walnuts.       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     Positive Lyme disease serology 7/22/2020       PAST SURGICAL HISTORY:  Past Surgical History:   Procedure Laterality Date     EP TEMP PACEMAKER INSERT N/A 7/5/2020    Procedure: EP TEMP PACEMAKER INSERT;  Surgeon: Emani Lopez MD;  Location:  [facility]     GENITOURINARY SURGERY  vasectomy       FAMILY HISTORY:  Family History   Problem Relation Age of Onset     Anxiety Disorder Mother         Hx of Zoloft, now on Lexapro     Thyroid Disease Mother      Heart Defect Father          Replaced heart valve     Anxiety Disorder Brother         On Lexapro     Melanoma Brother      Hypertension Maternal Grandfather      Asthma Paternal Grandfather        SOCIAL HISTORY:  Social History     Socioeconomic History     Marital status:      Spouse name: None     Number of children: None     Years of education: None     Highest education level: None   Occupational History     None   Social Needs     Financial resource strain: None     Food insecurity     Worry: None     Inability: None     Transportation needs     Medical: None     Non-medical: None   Tobacco Use     Smoking status: Former Smoker     Smokeless tobacco: Never Used     Tobacco comment: Quit at age 23 yo, periodic use until age 26   Substance and Sexual Activity     Alcohol use: Yes     Alcohol/week: 2.5 standard drinks     Types: 3 Cans of beer per week     Comment: ocassional     Drug use: No     Sexual activity: Yes   Lifestyle     Physical activity     Days per week: None     Minutes per session: None     Stress: None   Relationships     Social connections     Talks on phone: None     Gets together: None     Attends Faith service: None     Active member of club or organization: None     Attends meetings of clubs or organizations: None     Relationship status: None     Intimate partner violence     Fear of current or ex partner: None     Emotionally abused: None     Physically abused: None     Forced sexual activity: None   Other Topics Concern     Parent/sibling w/ CABG, MI or angioplasty before 65F 55M? Not Asked   Social History Narrative     None       Review of Systems:  Skin:  Negative     Eyes:  Negative glasses  ENT:  Negative    Respiratory:  Negative    Cardiovascular:  Negative    Gastroenterology: Negative    Genitourinary:  Negative    Musculoskeletal:  Negative    Neurologic:  Negative    Psychiatric:  Negative    Heme/Lymph/Imm:  Positive for allergies  Endocrine:  Negative      Physical Exam:  Vitals: /78  "(BP Location: Right arm, Patient Position: Sitting, Cuff Size: Adult Regular)   Pulse 76   Ht 1.93 m (6' 3.98\")   Wt 90.9 kg (200 lb 8 oz)   SpO2 97%   BMI 24.42 kg/m        Recent Lab Results:  LIPID RESULTS:  Lab Results   Component Value Date    CHOL 252 (H) 06/20/2018    HDL 62 06/20/2018     (H) 06/20/2018    TRIG 132 06/20/2018       LIVER ENZYME RESULTS:  Lab Results   Component Value Date    AST 18 07/05/2020    ALT 34 07/05/2020       CBC RESULTS:  Lab Results   Component Value Date    WBC 5.8 07/06/2020    RBC 3.99 (L) 07/06/2020    HGB 11.5 (L) 07/06/2020    HCT 37.1 (L) 07/06/2020    MCV 93 07/06/2020    MCH 28.8 07/06/2020    MCHC 31.0 (L) 07/06/2020    RDW 12.5 07/06/2020     07/06/2020       BMP RESULTS:  Lab Results   Component Value Date     07/05/2020    POTASSIUM 4.4 07/05/2020    CHLORIDE 106 07/05/2020    CO2 28 07/05/2020    ANIONGAP 4 07/05/2020    GLC 94 07/05/2020    BUN 23 07/05/2020    CR 1.05 07/05/2020    GFRESTIMATED 86 07/05/2020    GFRESTBLACK >90 07/05/2020    MADELYN 9.1 07/05/2020        A1C RESULTS:  No results found for: A1C    INR RESULTS:  No results found for: INR        CC  Guerrero Iqbal MD  [address] [place] [address]                  "

## 2020-07-31 NOTE — PROGRESS NOTES
Service Date: 07/31/2020      CARDIOLOGY CLINIC CONSULTATION       REASON FOR VISIT:  Complete heart block, Lyme carditis.      HISTORY OF PRESENTING ILLNESS:  This is a very pleasant, 44-year-old gentleman who is seeing me at [facility].  The patient was admitted in 07/2020 to the [facility] with not feeling well, febrile illness, polyarthritis and was noted to have a complete heart block in the setting of Lyme carditis.  He had a temporary pacemaker implanted by Dr. Lopez.  Subsequently, he was treated by ID with antibiotics.  A Lyme titer did come back positive for serology and confirmed Western blot for IgM.  He was given antibiotics, and now he has completed the course a few days ago.  He was told to follow up with an EP MD in followup at the [facility] in 1 month after discharge, but he has been scheduled to see me at [facility].      The patient denies any cardiovascular issues.  He says his father had a history of bicuspid aortic valve and needed valve replacement surgery in the 40s-50s.  He has a history of mild hyperlipidemia with his last LDL being 164 in 2018.  He is functionally very fit and healthy and lean.  He runs.  He says his LDL is high, but he has that history in the family.  He does not eat high-calorie foods, but still his LDL remains elevated.  Otherwise, the only medication he takes right now is Lexapro for anxiety.      He denies any cardiovascular symptoms, functionally very active.  The heart block resolved on subsequent ECGs, and he remains asymptomatic now.      PHYSICAL EXAMINATION:   VITAL SIGNS:  Blood pressure is 117/78 with a pulse of 76.   GENERAL:  Alert and oriented x3, in no acute distress.   NECK:  Supple.  JVP is normal.   LUNGS:  Clear.   CARDIAC:  Sounds are regular.  No murmurs, rubs or gallops.   EXTREMITIES:  Warm without edema.   PSYCHIATRIC:  Appropriate affect.      ASSESSMENT AND PLAN:  Mr. Chente Cerna is 44.  He has a positive Lyme  serology and a transient complete heart block that has resolved.  At this point, I recommend getting an echocardiogram given his family history of bicuspid aortic valve and his recent carditis.  His troponin was negative during hospitalizations.  I doubt he has any LV dysfunction, but would like to get a baseline echocardiogram.  I would also put a 30 day event monitor.  Assuming these are normal, I would not do any further workup.  Of course, if we see any further episodes of AV block, I would recommend immediate consultation for EP.  His LDL was high.  I am going to recheck that for routine screening, and per his request because of mild anemia, I am going to add on another CBC to see which way it is going.  If it is low, he will follow up with his PCP for that.  We will connect back with all of these in the next 4-6 weeks.  I have told him that if he does end up having a bicuspid aortic valve, we will order an MRA of his aorta to better evaluate his aorta for any aortopathy.      cc:   Guerrero Iqbal MD   [facility] [address] [place] [address]         AMOS SUH MD             D: 2020   T: 2020   MT: dion      Name:     SAUL HOUSTON   MRN:      [id number]        Account:      [id number]   :      1975           Service Date: 2020      Document: [id number]

## 2020-07-31 NOTE — LETTER
7/31/2020    Guerrero Iqbal MD  [address]  [facility] [address]    RE: Chente Cerna       Dear Colleague,    I had the pleasure of seeing Chente Cerna in the [facility].    HPI and Plan:   See dictation [id number]    Orders Placed This Encounter   Procedures     Lipid Profile     CBC with platelets     Follow-Up with Cardiologist     Cardiac Event Monitor Adult Pediatric     Echocardiogram Complete     No orders of the defined types were placed in this encounter.    Medications Discontinued During This Encounter   Medication Reason     doxycycline hyclate (VIBRA-TABS) 100 MG tablet Medication Reconciliation Clean Up         Encounter Diagnoses   Name Primary?     Complete heart block (H)      Lyme disease      Screening for hyperlipidemia Yes       CURRENT MEDICATIONS:  Current Outpatient Medications   Medication Sig Dispense Refill     escitalopram (LEXAPRO) 10 MG tablet TAKE 1 AND 1/2 TABLETS BY  MOUTH DAILY 135 tablet 0       ALLERGIES     Allergies   Allergen Reactions     Penicillins Anaphylaxis and Hives     Tolerated desensitization to ceftriaxone 7/6 without any reaction     Nuts Swelling     06/22/20 per patient - certain nuts cause odd sensation in throat which resolves within 30 minutes without action. These nuts include: hazelnut, almond, brazil, pistachio, macadamia. Eats without problems: peanuts, cashews, pecans, walnuts.       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     Positive Lyme disease serology 7/22/2020       PAST SURGICAL HISTORY:  Past Surgical History:   Procedure Laterality Date     EP TEMP PACEMAKER INSERT N/A 7/5/2020    Procedure: EP TEMP PACEMAKER INSERT;  Surgeon: Emani Lopez MD;  Location:  [facility]ARD[facility]ATH LAB      GENITOURINARY SURGERY  vasectomy       FAMILY HISTORY:  Family History   Problem Relation Age of Onset     Anxiety Disorder Mother         Hx of Zoloft, now on Lexapro     Thyroid Disease Mother      Heart Defect Father         Replaced heart valve     Anxiety Disorder Brother         On Lexapro     Melanoma Brother      Hypertension Maternal Grandfather      Asthma Paternal Grandfather        SOCIAL HISTORY:  Social History     Socioeconomic History     Marital status:      Spouse name: None     Number of children: None     Years of education: None     Highest education level: None   Occupational History     None   Social Needs     Financial resource strain: None     Food insecurity     Worry: None     Inability: None     Transportation needs     Medical: None     Non-medical: None   Tobacco Use     Smoking status: Former Smoker     Smokeless tobacco: Never Used     Tobacco comment: Quit at age 21 yo, periodic use until age 26   Substance and Sexual Activity     Alcohol use: Yes     Alcohol/week: 2.5 standard drinks     Types: 3 Cans of beer per week     Comment: ocassional     Drug use: No     Sexual activity: Yes   Lifestyle     Physical activity     Days per week: None     Minutes per session: None     Stress: None   Relationships     Social connections     Talks on phone: None     Gets together: None     Attends Caodaism service: None     Active member of club or organization: None     Attends meetings of clubs or organizations: None     Relationship status: None     Intimate partner violence     Fear of current or ex partner: None     Emotionally abused: None     Physically abused: None     Forced sexual activity: None   Other Topics Concern     Parent/sibling w/ CABG, MI or angioplasty before 65F 55M? Not Asked   Social History Narrative     None       Review of Systems:  Skin:  Negative     Eyes:  Negative glasses  ENT:  Negative    Respiratory:  Negative    Cardiovascular:  Negative   "  Gastroenterology: Negative    Genitourinary:  Negative    Musculoskeletal:  Negative    Neurologic:  Negative    Psychiatric:  Negative    Heme/Lymph/Imm:  Positive for allergies  Endocrine:  Negative      Physical Exam:  Vitals: /78 (BP Location: Right arm, Patient Position: Sitting, Cuff Size: Adult Regular)   Pulse 76   Ht 1.93 m (6' 3.98\")   Wt 90.9 kg (200 lb 8 oz)   SpO2 97%   BMI 24.42 kg/m        Recent Lab Results:  LIPID RESULTS:  Lab Results   Component Value Date    CHOL 252 (H) 06/20/2018    HDL 62 06/20/2018     (H) 06/20/2018    TRIG 132 06/20/2018       LIVER ENZYME RESULTS:  Lab Results   Component Value Date    AST 18 07/05/2020    ALT 34 07/05/2020       CBC RESULTS:  Lab Results   Component Value Date    WBC 5.8 07/06/2020    RBC 3.99 (L) 07/06/2020    HGB 11.5 (L) 07/06/2020    HCT 37.1 (L) 07/06/2020    MCV 93 07/06/2020    MCH 28.8 07/06/2020    MCHC 31.0 (L) 07/06/2020    RDW 12.5 07/06/2020     07/06/2020       BMP RESULTS:  Lab Results   Component Value Date     07/05/2020    POTASSIUM 4.4 07/05/2020    CHLORIDE 106 07/05/2020    CO2 28 07/05/2020    ANIONGAP 4 07/05/2020    GLC 94 07/05/2020    BUN 23 07/05/2020    CR 1.05 07/05/2020    GFRESTIMATED 86 07/05/2020    GFRESTBLACK >90 07/05/2020    MADELYN 9.1 07/05/2020        A1C RESULTS:  No results found for: A1C    INR RESULTS:  No results found for: INR        CC  Guerrero Iqbal MD  [address] [place] [address]                    Thank you for allowing me to participate in the care of your patient.      Sincerely,     Leanna Galindo MD     [facility]    cc:   Guerrero Iqbal MD  [address] [place] [address]        "

## 2020-07-31 NOTE — LETTER
7/31/2020      Guerrero Iqbal MD  [address]  [facility] [address]      RE: Chente A Eryn       Dear Colleague,    I had the pleasure of seeing Chente Woodcindy in the [facility].    Service Date: 07/31/2020      CARDIOLOGY CLINIC CONSULTATION       REASON FOR VISIT:  Complete heart block, Lyme carditis.      HISTORY OF PRESENTING ILLNESS:  This is a very pleasant, 44-year-old gentleman who is seeing me at [facility].  The patient was admitted in 07/2020 to the [facility] with not feeling well, febrile illness, polyarthritis and was noted to have a complete heart block in the setting of Lyme carditis.  He had a temporary pacemaker implanted by Dr. Lopez.  Subsequently, he was treated by ID with antibiotics.  A Lyme titer did come back positive for serology and confirmed Western blot for IgM.  He was given antibiotics, and now he has completed the course a few days ago.  He was told to follow up with an EP MD in followup at the [place] [facility] in 1 month after discharge, but he has been scheduled to see me at [facility].      The patient denies any cardiovascular issues.  He says his father had a history of bicuspid aortic valve and needed valve replacement surgery in the 40s-50s.  He has a history of mild hyperlipidemia with his last LDL being 164 in 2018.  He is functionally very fit and healthy and lean.  He runs.  He says his LDL is high, but he has that history in the family.  He does not eat high-calorie foods, but still his LDL remains elevated.  Otherwise, the only medication he takes right now is Lexapro for anxiety.      He denies any cardiovascular symptoms, functionally very active.  The heart block resolved on subsequent ECGs, and he remains asymptomatic now.      PHYSICAL EXAMINATION:   VITAL SIGNS:  Blood pressure is 117/78 with a pulse of 76.   GENERAL:  Alert and oriented x3, in no acute distress.   NECK:  Supple.  JVP is  normal.   LUNGS:  Clear.   CARDIAC:  Sounds are regular.  No murmurs, rubs or gallops.   EXTREMITIES:  Warm without edema.   PSYCHIATRIC:  Appropriate affect.      ASSESSMENT AND PLAN:  Mr. Chente Houston is 44.  He has a positive Lyme serology and a transient complete heart block that has resolved.  At this point, I recommend getting an echocardiogram given his family history of bicuspid aortic valve and his recent carditis.  His troponin was negative during hospitalizations.  I doubt he has any LV dysfunction, but would like to get a baseline echocardiogram.  I would also put a 30 day event monitor.  Assuming these are normal, I would not do any further workup.  Of course, if we see any further episodes of AV block, I would recommend immediate consultation for EP.  His LDL was high.  I am going to recheck that for routine screening, and per his request because of mild anemia, I am going to add on another CBC to see which way it is going.  If it is low, he will follow up with his PCP for that.  We will connect back with all of these in the next 4-6 weeks.  I have told him that if he does end up having a bicuspid aortic valve, we will order an MRA of his aorta to better evaluate his aorta for any aortopathy.      cc:   Guerrero Iqbal MD   [facility] [address] [place] [address]         AMOS SUH MD             D: 2020   T: 2020   MT: dion      Name:     CHENTE HOUSTON   MRN:      [id number]        Account:      [id number]   :      1975           Service Date: 2020      Document: [id number]         Outpatient Encounter Medications as of 2020   Medication Sig Dispense Refill     escitalopram (LEXAPRO) 10 MG tablet TAKE 1 AND 1/2 TABLETS BY  MOUTH DAILY 135 tablet 0     [DISCONTINUED] doxycycline hyclate (VIBRA-TABS) 100 MG tablet Take 1 tablet (100 mg) by mouth 2 times daily for 18 days 36 tablet 0     No facility-administered encounter  medications on file as of 7/31/2020.        Again, thank you for allowing me to participate in the care of your patient.      Sincerely,    Leanna Galindo MD     [facility]

## 2020-08-12 ENCOUNTER — TELEPHONE (OUTPATIENT)
Dept: CARDIOLOGY | Facility: CLINIC | Age: 45
End: 2020-08-12

## 2020-08-13 ENCOUNTER — HOSPITAL ENCOUNTER (OUTPATIENT)
Dept: CARDIOLOGY | Facility: CLINIC | Age: 45
Discharge: HOME | End: 2020-08-13
Attending: INTERNAL MEDICINE | Admitting: INTERNAL MEDICINE
Payer: COMMERCIAL

## 2020-08-13 DIAGNOSIS — A69.20 LYME DISEASE: ICD-10-CM

## 2020-08-13 DIAGNOSIS — Z13.220 SCREENING FOR HYPERLIPIDEMIA: ICD-10-CM

## 2020-08-13 DIAGNOSIS — I44.2 COMPLETE HEART BLOCK (H): ICD-10-CM

## 2020-08-13 LAB
CHOLEST SERPL-MCNC: 265 MG/DL
ERYTHROCYTE [DISTWIDTH] IN BLOOD BY AUTOMATED COUNT: 12.8 % (ref 10–15)
HCT VFR BLD AUTO: 43 % (ref 40–53)
HDLC SERPL-MCNC: 65 MG/DL
HGB BLD-MCNC: 13.8 G/DL (ref 13.3–17.7)
LDLC SERPL CALC-MCNC: 166 MG/DL
MCH RBC QN AUTO: 29.4 PG (ref 26.5–33)
MCHC RBC AUTO-ENTMCNC: 32.1 G/DL (ref 31.5–36.5)
MCV RBC AUTO: 92 FL (ref 78–100)
NONHDLC SERPL-MCNC: 200 MG/DL
PLATELET # BLD AUTO: 245 10E9/L (ref 150–450)
RBC # BLD AUTO: 4.69 10E12/L (ref 4.4–5.9)
TRIGL SERPL-MCNC: 169 MG/DL
WBC # BLD AUTO: 3.8 10E9/L (ref 4–11)

## 2020-08-13 PROCEDURE — 36415 COLL VENOUS BLD VENIPUNCTURE: CPT | Performed by: INTERNAL MEDICINE

## 2020-08-13 PROCEDURE — 93306 TTE W/DOPPLER COMPLETE: CPT

## 2020-08-13 PROCEDURE — 93272 ECG/REVIEW INTERPRET ONLY: CPT | Performed by: INTERNAL MEDICINE

## 2020-08-13 PROCEDURE — 93306 TTE W/DOPPLER COMPLETE: CPT | Mod: 26 | Performed by: INTERNAL MEDICINE

## 2020-08-13 PROCEDURE — 85027 COMPLETE CBC AUTOMATED: CPT | Performed by: INTERNAL MEDICINE

## 2020-08-13 PROCEDURE — 80061 LIPID PANEL: CPT | Performed by: INTERNAL MEDICINE

## 2020-08-13 PROCEDURE — 93270 REMOTE 30 DAY ECG REV/REPORT: CPT

## 2020-08-20 ENCOUNTER — CARE COORDINATION (OUTPATIENT)
Dept: CARDIOLOGY | Facility: CLINIC | Age: 45
End: 2020-08-20

## 2020-09-24 ENCOUNTER — VIRTUAL VISIT (OUTPATIENT)
Dept: CARDIOLOGY | Facility: CLINIC | Age: 45
End: 2020-09-24
Attending: INTERNAL MEDICINE
Payer: COMMERCIAL

## 2020-09-24 DIAGNOSIS — A69.20 LYME DISEASE: ICD-10-CM

## 2020-09-24 DIAGNOSIS — Z13.220 SCREENING FOR HYPERLIPIDEMIA: ICD-10-CM

## 2020-09-24 DIAGNOSIS — I44.2 COMPLETE HEART BLOCK (H): ICD-10-CM

## 2020-09-24 PROCEDURE — 99214 OFFICE O/P EST MOD 30 MIN: CPT | Mod: 95 | Performed by: INTERNAL MEDICINE

## 2020-09-24 NOTE — PROGRESS NOTES
"Chente Cerna is a 44 year old male who is being evaluated via a billable video visit.      The patient has been notified of following:     \"This video visit will be conducted via a call between you and your physician/provider. We have found that certain health care needs can be provided without the need for an in-person physical exam.  This service lets us provide the care you need with a video conversation.  If a prescription is necessary we can send it directly to your pharmacy.  If lab work is needed we can place an order for that and you can then stop by our lab to have the test done at a later time.    Video visits are billed at different rates depending on your insurance coverage.  Please reach out to your insurance provider with any questions.    If during the course of the call the physician/provider feels a video visit is not appropriate, you will not be charged for this service.\"    Patient has given verbal consent for Video visit? Yes  How would you like to obtain your AVS? MyChart  If you are dropped from the video visit, the video invite should be resent to: Elham Send to e-mail at: [email or web address]   If not available please reach out to [phone]  Will anyone else be joining your video visit? No    Vitals reported by patient:  B/P: 130/88  HR: 65  Weight: 200 lbs    Review Of Systems  Skin: negative  Eyes: negative  Ears/Nose/Throat: negative  Respiratory: negative  Cardiovascular: negative  Gastrointestinal: negative  Genitourinary: negative  Musculoskeletal: negative  Neurologic: negative  Psychiatric: negative  Hematologic/Lymphatic/Immunologic: negative  Endocrine: negative    Lita Feliciano MA        Video-Visit Details    Type of service:  Video Visit    Video Start Time: 11:20 AM  Video End Time: 11:32 AM    Originating Location (pt. Location): Home    Distant Location (provider location):  [facility]     [facility] " [facility]    Leanna Galindo MD    HPI and Plan:   See dictation [id number]    No orders of the defined types were placed in this encounter.    No orders of the defined types were placed in this encounter.    There are no discontinued medications.      Encounter Diagnoses   Name Primary?     Complete heart block (H)      Lyme disease      Screening for hyperlipidemia        CURRENT MEDICATIONS:  Current Outpatient Medications   Medication Sig Dispense Refill     escitalopram (LEXAPRO) 10 MG tablet TAKE 1 AND 1/2 TABLETS BY  MOUTH DAILY 135 tablet 0       ALLERGIES     Allergies   Allergen Reactions     Penicillins Anaphylaxis and Hives     Tolerated desensitization to ceftriaxone 7/6 without any reaction     Nuts Swelling     06/22/20 per patient - certain nuts cause odd sensation in throat which resolves within 30 minutes without action. These nuts include: hazelnut, almond, brazil, pistachio, macadamia. Eats without problems: peanuts, cashews, pecans, walnuts.       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     Positive Lyme disease serology 7/22/2020       PAST SURGICAL HISTORY:  Past Surgical History:   Procedure Laterality Date     EP TEMP PACEMAKER INSERT N/A 7/5/2020    Procedure: EP TEMP PACEMAKER INSERT;  Surgeon: Emani Lopez MD;  Location:  HEART CARDIAC CATH LAB     GENITOURINARY SURGERY  vasectomy       FAMILY HISTORY:  Family History   Problem Relation Age of Onset     Anxiety Disorder Mother         Hx of Zoloft, now on Lexapro     Thyroid Disease Mother      Heart Defect Father         Replaced heart valve     Anxiety Disorder Brother         On Lexapro     Melanoma Brother      Hypertension Maternal Grandfather      Asthma Paternal Grandfather        SOCIAL HISTORY:  Social History      Socioeconomic History     Marital status:      Spouse name: None     Number of children: None     Years of education: None     Highest education level: None   Occupational History     None   Social Needs     Financial resource strain: None     Food insecurity     Worry: None     Inability: None     Transportation needs     Medical: None     Non-medical: None   Tobacco Use     Smoking status: Former Smoker     Smokeless tobacco: Never Used     Tobacco comment: Quit at age 23 yo, periodic use until age 26   Substance and Sexual Activity     Alcohol use: Yes     Alcohol/week: 2.5 standard drinks     Types: 3 Cans of beer per week     Comment: ocassional     Drug use: No     Sexual activity: Yes   Lifestyle     Physical activity     Days per week: None     Minutes per session: None     Stress: None   Relationships     Social connections     Talks on phone: None     Gets together: None     Attends Advent service: None     Active member of club or organization: None     Attends meetings of clubs or organizations: None     Relationship status: None     Intimate partner violence     Fear of current or ex partner: None     Emotionally abused: None     Physically abused: None     Forced sexual activity: None   Other Topics Concern     Parent/sibling w/ CABG, MI or angioplasty before 65F 55M? Not Asked   Social History Narrative     None       Review of Systems:  Skin:        Eyes:       ENT:       Respiratory:       Cardiovascular:       Gastroenterology:      Genitourinary:       Musculoskeletal:       Neurologic:       Psychiatric:       Heme/Lymph/Imm:       Endocrine:         Physical Exam:  Vitals: There were no vitals taken for this visit.    Recent Lab Results:  LIPID RESULTS:  Lab Results   Component Value Date    CHOL 265 (H) 08/13/2020    HDL 65 08/13/2020     (H) 08/13/2020    TRIG 169 (H) 08/13/2020       LIVER ENZYME RESULTS:  Lab Results   Component Value Date    AST 18 07/05/2020    ALT 34  07/05/2020       CBC RESULTS:  Lab Results   Component Value Date    WBC 3.8 (L) 08/13/2020    RBC 4.69 08/13/2020    HGB 13.8 08/13/2020    HCT 43.0 08/13/2020    MCV 92 08/13/2020    MCH 29.4 08/13/2020    MCHC 32.1 08/13/2020    RDW 12.8 08/13/2020     08/13/2020       BMP RESULTS:  Lab Results   Component Value Date     07/05/2020    POTASSIUM 4.4 07/05/2020    CHLORIDE 106 07/05/2020    CO2 28 07/05/2020    ANIONGAP 4 07/05/2020    GLC 94 07/05/2020    BUN 23 07/05/2020    CR 1.05 07/05/2020    GFRESTIMATED 86 07/05/2020    GFRESTBLACK >90 07/05/2020    MADELYN 9.1 07/05/2020        A1C RESULTS:  No results found for: A1C    INR RESULTS:  No results found for: INR        CC  Leanna Galindo MD  [address]  [place] [id number]

## 2020-09-24 NOTE — LETTER
"9/24/2020    Guerrero Iqbal MD  [address]  [facility] [address]    RE: Chente Cerna       Dear Colleague,    I had the pleasure of seeing Chente Cerna in the [facility].    Chente Cerna is a 44 year old male who is being evaluated via a billable video visit.      The patient has been notified of following:     \"This video visit will be conducted via a call between you and your physician/provider. We have found that certain health care needs can be provided without the need for an in-person physical exam.  This service lets us provide the care you need with a video conversation.  If a prescription is necessary we can send it directly to your pharmacy.  If lab work is needed we can place an order for that and you can then stop by our lab to have the test done at a later time.    Video visits are billed at different rates depending on your insurance coverage.  Please reach out to your insurance provider with any questions.    If during the course of the call the physician/provider feels a video visit is not appropriate, you will not be charged for this service.\"    Patient has given verbal consent for Video visit? Yes  How would you like to obtain your AVS? MyChart  If you are dropped from the video visit, the video invite should be resent to: Elham Send to e-mail at: [email or web address]   If not available please reach out to [phone]  Will anyone else be joining your video visit? No    Vitals reported by patient:  B/P: 130/88  HR: 65  Weight: 200 lbs    Review Of Systems  Skin: negative  Eyes: negative  Ears/Nose/Throat: negative  Respiratory: negative  Cardiovascular: negative  Gastrointestinal: negative  Genitourinary: negative  Musculoskeletal: negative  Neurologic: negative  Psychiatric: negative  Hematologic/Lymphatic/Immunologic: negative  Endocrine: negative    Lita Feliciano MA        Video-Visit Details    Type of service:  Video " Visit    Video Start Time: 11:20 AM  Video End Time: 11:32 AM    Originating Location (pt. Location): Home    Distant Location (provider location):  [facility]     Platform used for Video Visit: Petey Galindo MD    HPI and Plan:     No orders of the defined types were placed in this encounter.    No orders of the defined types were placed in this encounter.    There are no discontinued medications.      Encounter Diagnoses   Name Primary?     Complete heart block (H)      Lyme disease      Screening for hyperlipidemia        CURRENT MEDICATIONS:  Current Outpatient Medications   Medication Sig Dispense Refill     escitalopram (LEXAPRO) 10 MG tablet TAKE 1 AND 1/2 TABLETS BY  MOUTH DAILY 135 tablet 0       ALLERGIES     Allergies   Allergen Reactions     Penicillins Anaphylaxis and Hives     Tolerated desensitization to ceftriaxone 7/6 without any reaction     Nuts Swelling     06/22/20 per patient - certain nuts cause odd sensation in throat which resolves within 30 minutes without action. These nuts include: hazelnut, almond, brazil, pistachio, macadamia. Eats without problems: peanuts, cashews, pecans, walnuts.       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     Positive Lyme disease serology 7/22/2020       PAST SURGICAL HISTORY:  Past Surgical History:   Procedure Laterality Date     EP TEMP PACEMAKER INSERT N/A 7/5/2020    Procedure: EP TEMP PACEMAKER INSERT;  Surgeon: Emani Lopez MD;  Location:  HEART CARDIAC CATH LAB     GENITOURINARY SURGERY  vasectomy       FAMILY HISTORY:  Family History   Problem Relation Age of Onset     Anxiety Disorder Mother         Hx of Zoloft, now on Lexapro     Thyroid Disease Mother      Heart Defect Father          Replaced heart valve     Anxiety Disorder Brother         On Lexapro     Melanoma Brother      Hypertension Maternal Grandfather      Asthma Paternal Grandfather        SOCIAL HISTORY:  Social History     Socioeconomic History     Marital status:      Spouse name: None     Number of children: None     Years of education: None     Highest education level: None   Occupational History     None   Social Needs     Financial resource strain: None     Food insecurity     Worry: None     Inability: None     Transportation needs     Medical: None     Non-medical: None   Tobacco Use     Smoking status: Former Smoker     Smokeless tobacco: Never Used     Tobacco comment: Quit at age 23 yo, periodic use until age 26   Substance and Sexual Activity     Alcohol use: Yes     Alcohol/week: 2.5 standard drinks     Types: 3 Cans of beer per week     Comment: ocassional     Drug use: No     Sexual activity: Yes   Lifestyle     Physical activity     Days per week: None     Minutes per session: None     Stress: None   Relationships     Social connections     Talks on phone: None     Gets together: None     Attends Denominational service: None     Active member of club or organization: None     Attends meetings of clubs or organizations: None     Relationship status: None     Intimate partner violence     Fear of current or ex partner: None     Emotionally abused: None     Physically abused: None     Forced sexual activity: None   Other Topics Concern     Parent/sibling w/ CABG, MI or angioplasty before 65F 55M? Not Asked   Social History Narrative     None       Review of Systems:  Skin:        Eyes:       ENT:       Respiratory:       Cardiovascular:       Gastroenterology:      Genitourinary:       Musculoskeletal:       Neurologic:       Psychiatric:       Heme/Lymph/Imm:       Endocrine:         Physical Exam:  Vitals: There were no vitals taken for this visit.    Recent Lab Results:  LIPID RESULTS:  Lab Results   Component Value  Date    CHOL 265 (H) 08/13/2020    HDL 65 08/13/2020     (H) 08/13/2020    TRIG 169 (H) 08/13/2020       LIVER ENZYME RESULTS:  Lab Results   Component Value Date    AST 18 07/05/2020    ALT 34 07/05/2020       CBC RESULTS:  Lab Results   Component Value Date    WBC 3.8 (L) 08/13/2020    RBC 4.69 08/13/2020    HGB 13.8 08/13/2020    HCT 43.0 08/13/2020    MCV 92 08/13/2020    MCH 29.4 08/13/2020    MCHC 32.1 08/13/2020    RDW 12.8 08/13/2020     08/13/2020       BMP RESULTS:  Lab Results   Component Value Date     07/05/2020    POTASSIUM 4.4 07/05/2020    CHLORIDE 106 07/05/2020    CO2 28 07/05/2020    ANIONGAP 4 07/05/2020    GLC 94 07/05/2020    BUN 23 07/05/2020    CR 1.05 07/05/2020    GFRESTIMATED 86 07/05/2020    GFRESTBLACK >90 07/05/2020    MADELYN 9.1 07/05/2020        A1C RESULTS:  No results found for: A1C    INR RESULTS:  No results found for: INR        Service Date: 09/24/2020      CARDIOLOGY CLINIC VIDEO CONSULTATION       HISTORY OF PRESENTING ILLNESS:  A very pleasant 44-year-old gentleman whom I had first met me at [facility] on 07/31/2020.  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  Temporary pacemaker was implanted.  Subsequently, was given antibiotics but ID Lyme titer did come back positive and heart block resolved.  He was told to follow up at the [place] but saw me at [facility].  Since then, he had no cardiovascular symptoms.  He is functionally very active, athletic and runs regularly.  No cardiovascular symptoms.  We got an echocardiogram given his family history of bicuspid aortic valve which showed normal LV function, no major valve disease.  Aortic valve is trileaflet.  I also put a 30-day event monitor on him just to ensure he does not have any rhythm issues, which is currently pending.  Will follow up on those results.      He denies any  cardiovascular symptoms.  He has been feeling well.  No active issues reported.      This was a video visit.      PHYSICAL EXAMINATION:   GENERAL:  Alert, oriented x3, in no acute distress.   NECK:  Appears supple.  JVP cannot be seen.   LUNGS:  Respirations are normal and nonlabored.   ABDOMEN:  Nondistended.   PSYCHIATRIC:  Appropriate affect.   HEENT:  No icterus, pallor.   EXTREMITIES:  No edema.      ASSESSMENT AND PLAN:  Chente is doing well from a cardiovascular standpoint.  His echo is fine.  I have not made any changes to his regimen.  I am going to wait for his event monitor to come back.  Assuming that is okay, I would not recommend any further workup.  I will have my nursing team get in touch with the patient after the results.      We had a discussion about his LDL cholesterol.  Based on the ASCVD calculator, his overall risk of 10-year cardiovascular risk is about 1.6%.  That puts him at low risk.  Fortunately, he does not eat unhealthy stuff and he is very active and runs.  I do not think there is much lifestyle modification-like room to reduce his LDL any further.  At this time, I think it is premature to get a calcium score given the fact his risk is only 1.6%.  However, he may want to consider that and that will be out of pocket.  Small risk of radiation has been explained to the patient.  Otherwise, he can consider getting a calcium score in the next 4-5 years when he does end up falling into the intermediate risk category.  At that time, he agreed to do it through us or his PCP.  He is going to think about it and call us if he is interested in getting a calcium score but otherwise, will follow up on his event monitor.      cc:   Guerrero Iqbal MD    [facility]   [address] [place] [address]         AMOS SUH MD         D: 2020   T: 2020   MT: KEENAN      Name:     CHENTE HOUSTON   MRN:      [id number]        Account:      [id number]   :       1975           Service Date: 09/24/2020      Document: [id number]        Thank you for allowing me to participate in the care of your patient.    Sincerely,     Leanna Galindo MD     [facility]

## 2020-09-24 NOTE — PROGRESS NOTES
Service Date: 09/24/2020      CARDIOLOGY CLINIC VIDEO CONSULTATION       HISTORY OF PRESENTING ILLNESS:  A very pleasant 44-year-old gentleman whom I had first met me at [facility] on 07/31/2020.  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  Temporary pacemaker was implanted.  Subsequently, was given antibiotics but ID Lyme titer did come back positive and heart block resolved.  He was told to follow up at the [place] but saw me at [facility].  Since then, he had no cardiovascular symptoms.  He is functionally very active, athletic and runs regularly.  No cardiovascular symptoms.  We got an echocardiogram given his family history of bicuspid aortic valve which showed normal LV function, no major valve disease.  Aortic valve is trileaflet.  I also put a 30-day event monitor on him just to ensure he does not have any rhythm issues, which is currently pending.  Will follow up on those results.      He denies any cardiovascular symptoms.  He has been feeling well.  No active issues reported.      This was a video visit.      PHYSICAL EXAMINATION:   GENERAL:  Alert, oriented x3, in no acute distress.   NECK:  Appears supple.  JVP cannot be seen.   LUNGS:  Respirations are normal and nonlabored.   ABDOMEN:  Nondistended.   PSYCHIATRIC:  Appropriate affect.   HEENT:  No icterus, pallor.   EXTREMITIES:  No edema.      ASSESSMENT AND PLAN:  Chente is doing well from a cardiovascular standpoint.  His echo is fine.  I have not made any changes to his regimen.  I am going to wait for his event monitor to come back.  Assuming that is okay, I would not recommend any further workup.  I will have my nursing team get in touch with the patient after the results.      We had a discussion about his LDL cholesterol.  Based on the ASCVD calculator, his overall risk of 10-year cardiovascular risk is about  1.6%.  That puts him at low risk.  Fortunately, he does not eat unhealthy stuff and he is very active and runs.  I do not think there is much lifestyle modification-like room to reduce his LDL any further.  At this time, I think it is premature to get a calcium score given the fact his risk is only 1.6%.  However, he may want to consider that and that will be out of pocket.  Small risk of radiation has been explained to the patient.  Otherwise, he can consider getting a calcium score in the next 4-5 years when he does end up falling into the intermediate risk category.  At that time, he agreed to do it through us or his PCP.  He is going to think about it and call us if he is interested in getting a calcium score but otherwise, will follow up on his event monitor.      cc:   Guerrero Iqbal MD    [facility]   [address] [place] [address]         AMOS SUH MD             D: 2020   T: 2020   MT: KEENAN      Name:     SAUL HOUSTON   MRN:      [id number]        Account:      [id number]   :      1975           Service Date: 2020      Document: [id number]

## 2021-01-04 DIAGNOSIS — F41.9 ANXIETY: ICD-10-CM

## 2021-01-04 DIAGNOSIS — Z81.8 FAMILY HISTORY OF ANXIETY DISORDER: ICD-10-CM

## 2021-01-04 RX ORDER — ESCITALOPRAM OXALATE 10 MG/1
TABLET ORAL
Qty: 135 TABLET | Refills: 0 | Status: SHIPPED | OUTPATIENT
Start: 2021-01-04 | End: 2021-03-26

## 2021-01-15 ENCOUNTER — HEALTH MAINTENANCE LETTER (OUTPATIENT)
Age: 46
End: 2021-01-15

## 2021-03-25 DIAGNOSIS — F41.9 ANXIETY: ICD-10-CM

## 2021-03-25 DIAGNOSIS — Z81.8 FAMILY HISTORY OF ANXIETY DISORDER: ICD-10-CM

## 2021-03-26 RX ORDER — ESCITALOPRAM OXALATE 10 MG/1
TABLET ORAL
Qty: 135 TABLET | Refills: 3 | Status: SHIPPED | OUTPATIENT
Start: 2021-03-26 | End: 2022-01-03

## 2021-09-09 ENCOUNTER — APPOINTMENT (OUTPATIENT)
Dept: URBAN - METROPOLITAN AREA CLINIC 255 | Age: 46
Setting detail: DERMATOLOGY
End: 2021-09-10

## 2021-09-09 DIAGNOSIS — L81.8 OTHER SPECIFIED DISORDERS OF PIGMENTATION: ICD-10-CM

## 2021-09-09 DIAGNOSIS — D22 MELANOCYTIC NEVI: ICD-10-CM

## 2021-09-09 DIAGNOSIS — D18.0 HEMANGIOMA: ICD-10-CM

## 2021-09-09 DIAGNOSIS — L81.4 OTHER MELANIN HYPERPIGMENTATION: ICD-10-CM

## 2021-09-09 DIAGNOSIS — Z71.89 OTHER SPECIFIED COUNSELING: ICD-10-CM

## 2021-09-09 DIAGNOSIS — L82.1 OTHER SEBORRHEIC KERATOSIS: ICD-10-CM

## 2021-09-09 PROBLEM — D18.01 HEMANGIOMA OF SKIN AND SUBCUTANEOUS TISSUE: Status: ACTIVE | Noted: 2021-09-09

## 2021-09-09 PROBLEM — D22.5 MELANOCYTIC NEVI OF TRUNK: Status: ACTIVE | Noted: 2021-09-09

## 2021-09-09 PROCEDURE — OTHER MIPS QUALITY: OTHER

## 2021-09-09 PROCEDURE — OTHER COUNSELING: OTHER

## 2021-09-09 PROCEDURE — 99203 OFFICE O/P NEW LOW 30 MIN: CPT

## 2021-09-09 ASSESSMENT — LOCATION DETAILED DESCRIPTION DERM
LOCATION DETAILED: SUPERIOR THORACIC SPINE
LOCATION DETAILED: LEFT SUPERIOR MEDIAL UPPER BACK
LOCATION DETAILED: SUPERIOR THORACIC SPINE
LOCATION DETAILED: RIGHT INFERIOR MEDIAL UPPER BACK
LOCATION DETAILED: RIGHT MEDIAL UPPER BACK

## 2021-09-09 ASSESSMENT — LOCATION SIMPLE DESCRIPTION DERM
LOCATION SIMPLE: LEFT UPPER BACK
LOCATION SIMPLE: RIGHT UPPER BACK
LOCATION SIMPLE: UPPER BACK
LOCATION SIMPLE: UPPER BACK

## 2021-09-09 ASSESSMENT — LOCATION ZONE DERM
LOCATION ZONE: TRUNK
LOCATION ZONE: TRUNK

## 2021-09-09 NOTE — PROCEDURE: MIPS QUALITY
Detail Level: Detailed
Quality 130: Documentation Of Current Medications In The Medical Record: Current Medications Documented
Quality 226: Preventive Care And Screening: Tobacco Use: Screening And Cessation Intervention: Patient screened for tobacco use and is an ex/non-smoker
Quality 110: Preventive Care And Screening: Influenza Immunization: Influenza Immunization previously received during influenza season
Quality 431: Preventive Care And Screening: Unhealthy Alcohol Use - Screening: Patient not identified as an unhealthy alcohol user when screened for unhealthy alcohol use using a systematic screening method

## 2021-09-09 NOTE — PROCEDURE: COUNSELING
"[facility] follow-up phone call completed with Tressa Patton, Mother of baby Jesika Mcrae, following discharge. Reports baby is ""lazy feeder at breast\"" and she has begun pumping and feeding every other feeding today and feels he takes bottle better. Discussed combination of breast and bottle feeding, importance of stimulation of milk supply and potential implications of early bottle introduction. Tressa Patton asked questions about exclusive pumping. Strongly encouraged to come in for outpatient lactation visit for assistance and provided reassurance that direct breastfeeding does get easier but we are happy to help her to work on pumping and maintaining her supply as well. Denies further questions or concerns at this time. Support given and encouraged to contact [facility] via phone or e-mail (provided) with further questions, concerns or to schedule and Outpatient Lactation Consultation as needed or desired.      Rick Agee RN, IBCLC  "
Acute Care - Speech Language Pathology   Swallow Initial Evaluation  Alin   Modified Barium Swallow Study (MBS)     Patient Name: Nely Grider  : 1942  MRN: [id number]  Today's Date: 2018  Onset of Illness/Injury or Date of Surgery: 18     Referring Physician: MD Ranjeet      Admit Date: 2018    Visit Dx:     ICD-10-CM ICD-9-CM   1. Impaired functional mobility, balance, gait, and endurance Z74.09 V49.89   2. Primary osteoarthritis of both hips M16.0 715.15   3. Status post total replacement of right hip Z96.641 V43.64   4. Pain of right hip joint M25.551 719.45   5. Impaired mobility and ADLs Z74.09 799.89   6. Dysphagia, unspecified type R13.10 787.20     Patient Active Problem List   Diagnosis   • Atypical chest pain   • Neck sprain   • Cough   • Dysuria   • Gastroesophageal reflux disease without esophagitis   • Mixed hyperlipidemia   • Essential hypertension   • Pain of right hip joint   • Sebaceous cyst   • Cervical sprain   • Controlled type 2 diabetes mellitus without complication, without long-term current use of insulin   • Sepsis   • Chronic kidney disease (CKD), stage III (moderate)   • Elevated LFTs   • Leukocytosis   • Mass of right hip region   • Anemia   • Constipation   • Diabetic polyneuropathy associated with type 2 diabetes mellitus   • Primary osteoarthritis of both hips   • Status post total replacement of right hip   • Hyponatremia   • Renal insufficiency   • Acute postoperative pain     Past Medical History:   Diagnosis Date   • Arthritis    • Cancer     cervical   • Cataract    • Diabetes mellitus     type 2 dm, check blood sugar every morning, diagnosed 3 or 4 years   • Full dentures    • GERD (gastroesophageal reflux disease)    • High cholesterol    • History of IBS    • Hypertension    • Neuropathy    • Wears glasses      Past Surgical History:   Procedure Laterality Date   • CARDIAC CATHETERIZATION     • CHOLECYSTECTOMY     • COLONOSCOPY     • EYE 
SURGERY      bilateral cataract   • HYSTERECTOMY      partial   • OOPHORECTOMY      one removed   • TONSILLECTOMY     • TOTAL HIP ARTHROPLASTY Right 4/19/2018    Procedure: RIGHT TOTAL HIP ARTHROPLASTY;  Surgeon: Carlos Pollack MD;  Location: [facility];  Service: Orthopedics   • WRIST SURGERY      metal plate          SWALLOW EVALUATION (last 72 hours)      SLP Adult Swallow Evaluation     Row Name 04/20/18 1445 04/20/18 1345                Rehab Evaluation    Document Type  -- evaluation  -WHITNEY (aura) MD (suzi) WHITNEY (c)       Subjective Information  -- no complaints  -WHITNEY (aura) MD (suzi) WHITNEY (jenna)       Patient Observations  -- alert;cooperative  -WHITNEY RODRIGUEZ (r) (suzi) WHITNEY (jenna)       Patient/Family Observations  -- Family at bedside  -WHITNEY medeiros (r)) WHITNEY (jenna)       Patient Effort  -- good  -WHITNEY (aura) MD (suzi) WHITNEY (jenna)          General Information    Patient Profile Reviewed  -- yes  -WHITNEY (kusum RODRIGUEZ (suzi) WHITNEY (jenna)       Pertinent History Of Current Problem  -- s/p total hip replacement; c/o difficulty swallowing/food sticking following surgery  -WHITNEY (aura) MD (suzi) WHITNEY (jenna)       Current Method of Nutrition  -- regular textures;thin liquids  -WHITNEY RODRIGEUZ (r) (suzi) WHITNEY (jenna)       Precautions/Limitations, Vision  -- WFL;for purposes of eval  -WHITNEY medeiros (r)) WHITNEY (jenna)       Precautions/Limitations, Hearing  -- WFL;for purposes of eval  -WHITNEY RODRIGUEZ (r) (suzi) WHITNEY (jenna)       Prior Level of Function-Communication WFL  -RD  --       Prior Level of Function-Swallowing no diet consistency restrictions;other (see comments)   new esophageal concerns  -WHITNEY no diet consistency restrictions  -WHITNEY RODRIGUEZ (r) (suzi) WHITNEY (c)       Plans/Goals Discussed with  -- patient and family;agreed upon  -WHITNEY RODRIGUEZ (r) (suzi) WHITNEY (jenna)       Barriers to Rehab  -- none identified  -WHITNEY medeiros (r)) WHITNEY (c)       Patient's Goals for Discharge eat/drink without coughing/choking  -WHITNEY eat/drink without coughing/choking  -WHITNEY medeiros (r)) WHITNEY (jenna)       Family Goals for Discharge patient able to eat/drink without coughing/choking  -WHITNEY patient able to 
eat/drink without coughing/choking  -RD (r) MD (t) RD (c)          Pain Assessment    Additional Documentation Pain Scale: Word Pre/Post-Treatment (Group)  -RD Pain Scale: FACES Pre/Post-Treatment (Group)  -RD (r) MD (t) RD (c)          Pain Scale: Numbers Pre/Post-Treatment    Pain Scale: Numbers, Pretreatment 0/10 - no pain  -RD  --       Pain Scale: Numbers, Post-Treatment 0/10 - no pain  -RD  --          Pain Scale: FACES Pre/Post-Treatment    Pain: FACES Scale, Pretreatment  -- 0-->no hurt  -RD (r) MD (t) RD (c)       Pain: FACES Scale, Post-Treatment  -- 0-->no hurt  -RD (r) MD (t) RD (c)          Oral Motor and Function    Dentition Assessment  -- natural, present and adequate  -RD (r) MD (t) RD (c)       Secretion Management  -- WNL/WFL  -RD (r) MD (t) RD (c)       Mucosal Quality  -- moist, healthy  -RD (r) MD (t) RD (c)       Volitional Swallow  -- weak  -RD (r) MD (t) RD (c)       Volitional Cough  -- WFL  -RD (r) MD (t) RD (c)          Oral Musculature and Cranial Nerve Assessment    Oral Motor General Assessment  -- WFL  -RD (r) MD (t) RD (c)          General Eating/Swallowing Observations    Eating/Swallowing Skills  -- fed by SLP;self-fed  -RD (r) MD (t) RD (c)       Positioning During Eating  -- upright 90 degree;upright in bed  -RD (r) MD (t) RD (c)       Utensils Used  -- spoon;cup;straw  -RD (r) MD (t) RD (c)       Consistencies Trialed  -- thin liquids;pureed;regular textures  -RD (r) MD (t) RD (c)          Clinical Swallow Eval    Oral Prep Phase  -- WFL  -RD (r) MD (t) RD (c)       Oral Transit  -- WFL  -RD (r) MD (t) RD (c)       Oral Residue  -- WFL  -RD (r) MD (t) RD (c)       Pharyngeal Phase  -- --   r/o pharyngela impairment  -RD (r) MD (t) RD (c)       Esophageal Phase  -- --   pt reports hx of GERD  -WHITNEY (r) MD (t) WHITNEY (jenna)       Clinical Swallow Evaluation Summary  -- Clinical dys eval complete. Pt w/ c/o difficulty swallowing and food sticking following surgery. Trialed thins via 
"tsp/cup/straw, puree, and solid. Inconsistent multiple swallows observed w/ all consistencies, pt reported needing to swallow multiple times to \"push food down.\" Pt began gagging w/ solid trial and reported feeling as if it was stuck in her throat, was able to clear w/ subsequent swallows and liquid wash. Rec: MBS to r/o pharyngeal dysfunction, NPO pending MBS results.   -WHITNEY (r) MD (t) WHITNEY (c)          MBS/VFSS    Utensils Used spoon;cup;straw  -RD  --       Consistencies Trialed thin liquids;pudding thick;regular textures  -RD  --          MBS/VFSS Interpretation    Oral Prep Phase WFL  -RD  --       Oral Transit Phase WFL  -RD  --       Oral Residue WFL  -RD  --       VFSS Summary Functional oropharyngeal swallow. Minimal penetration during the swallow that cleared upon completion of the swallow. No aspiration observed w/ any consistency. No significant pharyngeal residue noted. Pt observed w/ min retrograde flow below the upper esophageal sphincter in the cervical esophagus w/ solids that somewhat cleared w/ liquid wash. Prominent cricopharyngeus observed as well that may be contributing somewhat to pt's current symptoms. Pt may benefit from further esophageal workup given suspected esophageal dysphagia.   -RD  --          Initiation of Pharyngeal Swallow    Initiation of Pharyngeal Swallow bolus in pyriform sinuses;other (see comments)   functional for patient's age  -RD  --       Pharyngeal Phase functional pharyngeal phase of swallowing  -RD  --       Penetration During the Swallow thin liquids  -RD  --       Response to Penetration transient  -RD  --       Rosenbek's Scale thin:;2--->level 2  -RD  --       Pharyngeal Phase, Comment No significant pharyngeal residue noted  -RD  --          Esophageal Phase    Esophageal Phase esophageal retention with retrograde flow below PES;other (see comments)  -RD  --       Esophageal Phase, Comment Pt observed w/ min retrograde flow below the upper esophageal sphincter in "
Detail Level: Generalized
the cervical esophagus w/ solids that somewhat cleared w/ liquid wash. Prominent cricopharyngeus observed as well that may contributing to pt's current symptoms. Pt may benefit from further esophageal workup given suspected esophageal dysphagia.   -RD  --          Clinical Impression    SLP Swallowing Diagnosis functional oral phase;functional pharyngeal phase  -RD other (see comments)   r/o pharyngeal dysfunction  -RD (r) MD (suzi) WHITNEY (c)       Functional Impact no impact on function  -RD risk of aspiration/pneumonia;risk of malnutrition;risk of dehydration  -RD (r) MD (t) WHITNEY (c)       Rehab Potential/Prognosis, Swallowing good, to achieve stated therapy goals  -RD  --       Criteria for Skilled Therapeutic Interventions Met no problems identified which require skilled intervention  -RD demonstrates skilled criteria  -WHITNEY (r) MD (t) WHITNEY (jenna)          Recommendations    Therapy Frequency (Swallow) evaluation only  -RD  --       SLP Diet Recommendation soft textures;whole;thin liquids  -RD NPO   pending MBS results  -WHITNEY (r) MD (suzi) WHITNEY (c)       Recommended Diagnostics other (see comments)   pt may benefit from further esophageal workup  -RD VFSS (MBS)  -WHITNEY (aura) MD (suzi) WHITNEY (jenna)       Recommended Precautions and Strategies upright posture during/after eating;alternate between small bites of food and sips of liquid;other (see comments)   general aspiration/reflux precautions  -RD  --       SLP Rec. for Method of Medication Administration meds whole;with thin liquids;as tolerated  -RD  --         User Key  (r) = Recorded By, (t) = Taken By, (c) = Cosigned By    Initials Name Effective Dates    RD Priscila Cantu, MS CCC-SLP 04/03/18 -     MD Karol Kapoor, Speech Therapy Student 03/07/18 -         EDUCATION  The patient has been educated in the following areas:   Dysphagia (Swallowing Impairment) Oral Care/Hydration Modified Diet Instruction.    SLP Recommendation and Plan  SLP Swallowing Diagnosis: functional oral phase, 
Detail Level: Detailed
functional pharyngeal phase  SLP Diet Recommendation: soft textures, whole, thin liquids, meds whole in thins.  Recommended Precautions and Strategies: upright posture during/after eating, alternate between small bites of food and sips of liquid, other (see comments) (general aspiration/reflux precautions)        Recommended Diagnostics: other (see comments) (pt may benefit from further esophageal workup)  Criteria for Skilled Therapeutic Interventions Met: no problems identified which require skilled intervention     Rehab Potential/Prognosis, Swallowing: good, to achieve stated therapy goals  Therapy Frequency (Swallow): evaluation only          Plan of Care Reviewed With: patient, family  Plan of Care Review  Plan of Care Reviewed With: patient, family  Progress: no change             Time Calculation:         Time Calculation- SLP     Row Name 04/20/18 1605 04/20/18 1428          Time Calculation- SLP    SLP Start Time 1445  -RD 1345  -RD (r) MD (t) WHITNEY (c)     SLP Received On 04/20/18  -RD 04/20/18  -WHITNEY (r) MD (t) WHITNEY (c)       User Key  (r) = Recorded By, (t) = Taken By, (c) = Cosigned By    Initials Name Provider Type    RD Priscila Cantu MS CCC-SLP Speech and Language Pathologist    MD Karol Kapoor, Speech Therapy Student Speech Therapy Student          Therapy Charges for Today     Code Description Service Date Service Provider Modifiers Qty    94658102902 HC ST MOTION FLUORO EVAL SWALLOW 6 4/20/2018 Priscila Cantu MS CCC-SLP GN 1               Priscila Cantu MS CCC-SLP  4/20/2018

## 2021-10-24 ENCOUNTER — HEALTH MAINTENANCE LETTER (OUTPATIENT)
Age: 46
End: 2021-10-24

## 2021-12-30 NOTE — PATIENT INSTRUCTIONS
Preventive Health Recommendations  Male Ages 40 to 49    Yearly exam:             See your health care provider every year in order to  o   Review health changes.   o   Discuss preventive care.    o   Review your medicines if your doctor has prescribed any.    You should be tested each year for STDs (sexually transmitted diseases) if you re at risk.     Have a cholesterol test every 5 years.     Have a colonoscopy (test for colon cancer) if someone in your family has had colon cancer or polyps before age 50.     After age 45, have a diabetes test (fasting glucose). If you are at risk for diabetes, you should have this test every 3 years.      Talk with your health care provider about whether or not a prostate cancer screening test (PSA) is right for you.    Shots: Get a flu shot each year. Get a tetanus shot every 10 years.     Nutrition:    Eat at least 5 servings of fruits and vegetables daily.     Eat whole-grain bread, whole-wheat pasta and brown rice instead of white grains and rice.     Get adequate Calcium and Vitamin D.     Lifestyle    Exercise for at least 150 minutes a week (30 minutes a day, 5 days a week). This will help you control your weight and prevent disease.     Limit alcohol to one drink per day.     No smoking.     Wear sunscreen to prevent skin cancer.     See your dentist every six months for an exam and cleaning.      Thank you for coming in today!   If you receive a survey via My Chart, mail or phone, please let us know if there was anything you especially appreciated today or if there is any way we can improve our clinic. We value your input.     Likewise, we are working hard to attend to our digital reputation.    Please consider reviewing our clinic on Google and/or Facebook via the following links:    [email or web address]                                          We truly appreciate you taking the  "time to do this!    General Information:    Today you had your appointment with Guerrero Iqbal MD    I am in the clinic:  Tuesdays and Thursdays  And Monday and Friday mornings    I am not in the office Wednesdays, non urgent calls received on Wednesday will be addressed when I am back in the office on Thursday.    If lab work was done today as part of your evaluation you will generally be contacted via My Chart, mail, or phone with the results within 1-5 days. If there is an alarming result we will contact you by phone. Lab results come back at varying times, I generally wait until all labs are resulted before making comments on results. Please note labs are automatically released to My Chart once available.    If you need refills please contact your pharmacist. They will send a refill request to me to review. Please allow 3 business days for us to process all refill requests.    Please call or send a medical message with any questions or concerns.    Thank you for choosing [facility].  We truly appreciate you trusting us with your medical care.    Your next visit with us should be scheduled for Follow up in 1 year.     Listed next are the medical health Maintenance items we are tracking for your care and when they are next due (or overdue!)  Our goal is 100% \"up to date.\" Keep this list handy to call and schedule what you are due for in the future!    Health Maintenance Due   Topic Date Due     Pneumococcal Vaccine: Pediatrics (0 to 5 Years) and At-Risk Patients (6 to 64 Years) (1 of 2 - PPSV23) Never done       Sincerely,    Guerrero Iqbal MD    "

## 2021-12-30 NOTE — PROGRESS NOTES
"3  SUBJECTIVE:   CC: Chente Cerna is an 46 year old male who presents for preventive health visit.     Patient has been advised of split billing requirements and indicates understanding: Yes   ANXIETY:  Has known history of anxiety and has been on medication for this.  The patient does not report any significant side effects of this medication.  The prior symptoms leading to the original diagnosis and decision to start medication therapy are better.     Appetite is stable.  Sleeping patterns are stable.    Overall, the level of \"racing thoughts\", emotional lability, and ease of agitation are better.    No recent panic attacks.    LUCIAN-7 SCORE 6/20/2018 8/16/2018 1/3/2022   Total Score 11 2 3         Healthy Habits:    Do you get at least three servings of calcium containing foods daily (dairy, green leafy vegetables, etc.)? yes    Amount of exercise or daily activities, outside of work: 4-7 day(s) per week    Problems taking medications regularly No    Medication side effects: No    Have you had an eye exam in the past two years? yes    Do you see a dentist twice per year? yes    Do you have sleep apnea, excessive snoring or daytime drowsiness?no        PROBLEMS TO ADD ON...    Today's PHQ-2 Score:   PHQ-2 ( 1999 [employer]) 1/3/2022 7/24/2020   Q1: Little interest or pleasure in doing things 0 0   Q2: Feeling down, depressed or hopeless 0 0   PHQ-2 Score 0 0   PHQ-2 Total Score (12-17 Years)- Positive if 3 or more points; Administer PHQ-A if positive - 0     Abuse: Current or Past(Physical, Sexual or Emotional)- NO  Do you feel safe in your environment? YES    Have you ever done Advance Care Planning? (For example, a Health Directive, POLST, or a discussion with a medical provider or your loved ones about your wishes): No, advance care planning information given to patient to review.  Patient plans to discuss their wishes with loved ones or provider.      Social History     Tobacco Use     Smoking status: Former " Smoker     Packs/day: 0.10     Years: 1.00     Pack years: 0.10     Quit date: 1/3/1998     Years since quittin.0     Smokeless tobacco: Never Used     Tobacco comment: Quit at age 23 yo, periodic use until age 26   Substance Use Topics     Alcohol use: Yes     Alcohol/week: 2.5 standard drinks     Types: 3 Cans of beer per week     Comment: 7-10     If you drink alcohol do you typically have >3 drinks per day or >7 drinks per week? No                      Last PSA: No results found for: PSA    Reviewed orders with patient. 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, needed temp pacer     Positive Lyme disease serology 2020      Past Surgical History:   Procedure Laterality Date     EP TEMP PACEMAKER INSERT N/A 2020    Procedure: EP TEMP PACEMAKER INSERT;  Surgeon: Emani Lopez MD;  Location:  [facility]     GENITOURINARY SURGERY  vasectomy       ROS:  CONSTITUTIONAL: NEGATIVE for fever, chills, change in weight  INTEGUMENTARY/SKIN: NEGATIVE for worrisome rashes, moles or lesions  EYES: NEGATIVE for vision changes or irritation  ENT: NEGATIVE for ear, mouth and throat problems  RESP: NEGATIVE for significant cough or SOB  CV: NEGATIVE for chest pain, palpitations or peripheral edema  GI: NEGATIVE for nausea, abdominal pain, heartburn, or change in bowel habits   male: negative for dysuria, hematuria, decreased urinary stream, erectile dysfunction, urethral discharge  MUSCULOSKELETAL: NEGATIVE for significant arthralgias or myalgia  NEURO: NEGATIVE for weakness,  "dizziness or paresthesias  PSYCHIATRIC: NEGATIVE for changes in mood or affect    OBJECTIVE:   /72   Pulse 54   Ht 1.93 m (6' 4\")   Wt 96 kg (211 lb 9.6 oz)   SpO2 97%   BMI 25.76 kg/m    EXAM:  GENERAL: healthy, alert and no distress  EYES: Eyes grossly normal to inspection, PERRL and conjunctivae and sclerae normal  HENT: ear canals and TM's normal, nose and mouth without ulcers or lesions  NECK: no adenopathy, no asymmetry, masses, or scars and thyroid normal to palpation  RESP: lungs clear to auscultation - no rales, rhonchi or wheezes  CV: regular rate and rhythm, normal S1 S2, no S3 or S4, no murmur, click or rub, no peripheral edema and peripheral pulses strong  ABDOMEN: soft, nontender, no hepatosplenomegaly, no masses and bowel sounds normal   (male): normal male genitalia without lesions or urethral discharge, no hernia  RECTAL: normal sphincter tone, no rectal masses, prostate normal size, smooth, nontender without nodules or masses  MS: no gross musculoskeletal defects noted, no edema  SKIN: no suspicious lesions or rashes  NEURO: Normal strength and tone, mentation intact and speech normal  PSYCH: mentation appears normal, affect normal/bright    Diagnostic Test Results:  Labs reviewed in Epic    ASSESSMENT/PLAN:   Chente was seen today for physical.    Diagnoses and all orders for this visit:    Routine general medical examination at a health care facility    Need for vaccination  -     PNEUMOCOCCAL VACCINE,ADULT,SQ OR IM  -     VACCINE ADMINISTRATION, INITIAL    Anxiety  Reviewed the status of his anxiety management status at length.   he is satisfied with his current treatment including management of his difficulty with Uncontrolled worrying  Trouble relaxing.  Discussed the current specific treatment including the possible side effects from all current medications and the fact that if he is utilizing controlled substances, those meds require special handling of prescriptions and our need " "for close monitoring including regular follow up.    he desires to continue the current medication and agrees to the current schedule for visits every 12 month(s).  I reminded him that any noncompliance with regard to prescriptions or follow up may result in my declining to provide further refills of these medications.  We also ansered any questions about mood disorders including suspected pathophysiology, role of neurotransmitters, and treatment options including medication options (SSRIs that treat cause of sx and Benzos which treat the sx.) and counselling.    -     escitalopram (LEXAPRO) 10 MG tablet; TAKE 1 AND 1/2 TABLETS BY  MOUTH DAILY    Screening for heart disease  -     Comp. Metabolic Panel (14) ([facility])  -     Lipid Panel ([facility])    Screening for prostate cancer  -     PSA Serum ([facility])    Screening for colon cancer  -     Adult Gastro Ref - Procedure Only; Future        Patient has been advised of split billing requirements and indicates understanding: Yes  COUNSELING:  Reviewed preventive health counseling, as reflected in patient instructions       Regular exercise       Healthy diet/nutrition    Estimated body mass index is 25.76 kg/m  as calculated from the following:    Height as of this encounter: 1.93 m (6' 4\").    Weight as of this encounter: 96 kg (211 lb 9.6 oz).        He reports that he quit smoking about 24 years ago. He has a 0.10 pack-year smoking history. He has never used smokeless tobacco.      Counseling Resources:  ATP IV Guidelines  Pooled Cohorts Equation Calculator  FRAX Risk Assessment  ICSI Preventive Guidelines  Dietary Guidelines for Americans, 2010  USDA's MyPlate  ASA Prophylaxis  Lung CA Screening    Guerrero Iqbal MD  [facility]  "

## 2022-01-03 ENCOUNTER — OFFICE VISIT (OUTPATIENT)
Dept: FAMILY MEDICINE | Facility: CLINIC | Age: 47
End: 2022-01-03

## 2022-01-03 VITALS
HEART RATE: 54 BPM | HEIGHT: 76 IN | DIASTOLIC BLOOD PRESSURE: 72 MMHG | SYSTOLIC BLOOD PRESSURE: 125 MMHG | BODY MASS INDEX: 25.77 KG/M2 | OXYGEN SATURATION: 97 % | WEIGHT: 211.6 LBS

## 2022-01-03 DIAGNOSIS — Z13.6 SCREENING FOR HEART DISEASE: ICD-10-CM

## 2022-01-03 DIAGNOSIS — Z23 NEED FOR VACCINATION: ICD-10-CM

## 2022-01-03 DIAGNOSIS — F41.9 ANXIETY: ICD-10-CM

## 2022-01-03 DIAGNOSIS — Z00.00 ROUTINE GENERAL MEDICAL EXAMINATION AT A HEALTH CARE FACILITY: Primary | ICD-10-CM

## 2022-01-03 DIAGNOSIS — Z12.5 SCREENING FOR PROSTATE CANCER: ICD-10-CM

## 2022-01-03 DIAGNOSIS — Z12.11 SCREENING FOR COLON CANCER: ICD-10-CM

## 2022-01-03 PROCEDURE — 99214 OFFICE O/P EST MOD 30 MIN: CPT | Mod: 25 | Performed by: FAMILY MEDICINE

## 2022-01-03 PROCEDURE — 90732 PPSV23 VACC 2 YRS+ SUBQ/IM: CPT | Performed by: FAMILY MEDICINE

## 2022-01-03 PROCEDURE — 36415 COLL VENOUS BLD VENIPUNCTURE: CPT | Performed by: FAMILY MEDICINE

## 2022-01-03 PROCEDURE — 90471 IMMUNIZATION ADMIN: CPT | Mod: 59 | Performed by: FAMILY MEDICINE

## 2022-01-03 PROCEDURE — 99396 PREV VISIT EST AGE 40-64: CPT | Performed by: FAMILY MEDICINE

## 2022-01-03 RX ORDER — KETOROLAC TROMETHAMINE 5 MG/ML
SOLUTION OPHTHALMIC
COMMUNITY
Start: 2021-11-24 | End: 2022-08-10

## 2022-01-03 RX ORDER — ESCITALOPRAM OXALATE 10 MG/1
TABLET ORAL
Qty: 135 TABLET | Refills: 3 | Status: SHIPPED | OUTPATIENT
Start: 2022-01-03 | End: 2022-12-14

## 2022-01-03 RX ORDER — GATIFLOXACIN 5 MG/ML
SOLUTION/ DROPS OPHTHALMIC
COMMUNITY
Start: 2021-11-24 | End: 2022-08-10

## 2022-01-03 RX ORDER — PREDNISOLONE ACETATE 10 MG/ML
SUSPENSION/ DROPS OPHTHALMIC
COMMUNITY
Start: 2021-11-24 | End: 2022-08-10

## 2022-01-03 ASSESSMENT — ANXIETY QUESTIONNAIRES
6. BECOMING EASILY ANNOYED OR IRRITABLE: SEVERAL DAYS
7. FEELING AFRAID AS IF SOMETHING AWFUL MIGHT HAPPEN: NOT AT ALL
5. BEING SO RESTLESS THAT IT IS HARD TO SIT STILL: SEVERAL DAYS
IF YOU CHECKED OFF ANY PROBLEMS ON THIS QUESTIONNAIRE, HOW DIFFICULT HAVE THESE PROBLEMS MADE IT FOR YOU TO DO YOUR WORK, TAKE CARE OF THINGS AT HOME, OR GET ALONG WITH OTHER PEOPLE: NOT DIFFICULT AT ALL
GAD7 TOTAL SCORE: 3
1. FEELING NERVOUS, ANXIOUS, OR ON EDGE: SEVERAL DAYS
2. NOT BEING ABLE TO STOP OR CONTROL WORRYING: NOT AT ALL
3. WORRYING TOO MUCH ABOUT DIFFERENT THINGS: NOT AT ALL

## 2022-01-03 ASSESSMENT — MIFFLIN-ST. JEOR: SCORE: 1946.31

## 2022-01-03 ASSESSMENT — PATIENT HEALTH QUESTIONNAIRE - PHQ9: 5. POOR APPETITE OR OVEREATING: NOT AT ALL

## 2022-01-04 ASSESSMENT — ANXIETY QUESTIONNAIRES: GAD7 TOTAL SCORE: 3

## 2022-01-05 LAB
ALBUMIN SERPL-MCNC: 4.7 G/DL (ref 4–5)
ALBUMIN/GLOB SERPL: 1.6 {RATIO} (ref 1.2–2.2)
ALP SERPL-CCNC: 69 IU/L (ref 44–121)
ALT SERPL-CCNC: 56 IU/L (ref 0–44)
AST SERPL-CCNC: 29 IU/L (ref 0–40)
BILIRUB SERPL-MCNC: 0.4 MG/DL (ref 0–1.2)
BUN SERPL-MCNC: 16 MG/DL (ref 6–24)
BUN/CREATININE RATIO: 16 (ref 9–20)
CALCIUM SERPL-MCNC: 9.8 MG/DL (ref 8.7–10.2)
CHLORIDE SERPLBLD-SCNC: 102 MMOL/L (ref 96–106)
CHOLEST SERPL-MCNC: 304 MG/DL (ref 100–199)
CREAT SERPL-MCNC: 0.97 MG/DL (ref 0.76–1.27)
EGFR IF AFRICN AM: 109 ML/MIN/1.73
EGFR IF NONAFRICN AM: 94 ML/MIN/1.73
GLOBULIN, TOTAL: 2.9 G/DL (ref 1.5–4.5)
GLUCOSE SERPL-MCNC: 101 MG/DL (ref 65–99)
HDLC SERPL-MCNC: 56 MG/DL
LDL/HDL RATIO: 3.6 RATIO (ref 0–3.6)
LDLC SERPL CALC-MCNC: 203 MG/DL (ref 0–99)
POTASSIUM SERPL-SCNC: 5 MMOL/L (ref 3.5–5.2)
PROT SERPL-MCNC: 7.6 G/DL (ref 6–8.5)
PSA NG/ML: 0.4 NG/ML (ref 0–4)
SODIUM SERPL-SCNC: 140 MMOL/L (ref 134–144)
TOTAL CO2: 25 MMOL/L (ref 20–29)
TRIGL SERPL-MCNC: 230 MG/DL (ref 0–149)
VLDLC SERPL CALC-MCNC: 45 MG/DL (ref 5–40)

## 2022-01-10 ENCOUNTER — TELEPHONE (OUTPATIENT)
Dept: FAMILY MEDICINE | Facility: CLINIC | Age: 47
End: 2022-01-10

## 2022-01-10 DIAGNOSIS — E78.00 HYPERCHOLESTEREMIA: Primary | ICD-10-CM

## 2022-01-10 RX ORDER — ROSUVASTATIN CALCIUM 5 MG/1
TABLET, COATED ORAL
Qty: 45 TABLET | Refills: 3 | Status: SHIPPED | OUTPATIENT
Start: 2022-01-10 | End: 2023-01-23

## 2022-01-10 NOTE — TELEPHONE ENCOUNTER
----- Message from Guerrero Iqbal MD sent at 1/10/2022  1:18 PM CST -----  I think we should start on crestor 5 mg every other night.  KN

## 2022-01-10 NOTE — TELEPHONE ENCOUNTER
Patient saw lab results on mychart.  Patient willing to start Crestor 5 mg every other night. Prescription sent to pharmacy.  Patient will remy lipids in 6 weeks.

## 2022-04-13 DIAGNOSIS — E78.00 HYPERCHOLESTEREMIA: Primary | ICD-10-CM

## 2022-04-13 LAB
CHOL/HDL RATIO (RMG): 3.6 MG/DL (ref 0–4.5)
CHOLESTEROL: 226 MG/DL (ref 100–199)
HDL (RMG): 63 MG/DL (ref 40–?)
LDL CALCULATED (RMG): 136 MG/DL (ref 0–130)
TRIGLYCERIDES (RMG): 135 MG/DL (ref 0–149)

## 2022-04-13 PROCEDURE — 80061 LIPID PANEL: CPT | Performed by: FAMILY MEDICINE

## 2022-04-13 PROCEDURE — 36415 COLL VENOUS BLD VENIPUNCTURE: CPT | Performed by: FAMILY MEDICINE

## 2022-04-14 NOTE — RESULT ENCOUNTER NOTE
Dear Chente,     I am writing to report that your included test results are as expected.  Most labs contain some results that are slightly outside of the normal range, I have reviewed each of these results and they require no changes at this time.    Guerrero Iqbal MD

## 2022-04-27 ENCOUNTER — TRANSFERRED RECORDS (OUTPATIENT)
Dept: FAMILY MEDICINE | Facility: CLINIC | Age: 47
End: 2022-04-27

## 2022-08-10 ENCOUNTER — OFFICE VISIT (OUTPATIENT)
Dept: FAMILY MEDICINE | Facility: CLINIC | Age: 47
End: 2022-08-10

## 2022-08-10 VITALS
SYSTOLIC BLOOD PRESSURE: 130 MMHG | WEIGHT: 213 LBS | DIASTOLIC BLOOD PRESSURE: 72 MMHG | TEMPERATURE: 97.8 F | BODY MASS INDEX: 25.93 KG/M2 | HEART RATE: 69 BPM | OXYGEN SATURATION: 96 % | RESPIRATION RATE: 16 BRPM

## 2022-08-10 DIAGNOSIS — J06.9 VIRAL URI: Primary | ICD-10-CM

## 2022-08-10 DIAGNOSIS — R09.82 POST-NASAL DRIP: ICD-10-CM

## 2022-08-10 PROCEDURE — 99213 OFFICE O/P EST LOW 20 MIN: CPT | Performed by: FAMILY MEDICINE

## 2022-08-10 NOTE — PROGRESS NOTES
SUBJECTIVE:    Chente Cerna, is a 46 year old male with prior Hx of lyme carditis, presenting for the below:     1. 10 day Hx of coryzal symptoms. Initially with fever (Tmax 102) for first day, then resolved. Nasal congestion which has now improved, but with lingering sensation of mucus in back of throat and left sided swollen lymph node (notes this has improved in last few days).     Covid test negative x 3.  Gargling with salt water.    Denies shortness of breath, productive cough, rash.    OBJECTIVE:  Vitals:    08/10/22 1248   BP: 130/72   Pulse: 69   Resp: 16   Temp: 97.8  F (36.6  C)   SpO2: 96%   Weight: 96.6 kg (213 lb)    Body mass index is 25.93 kg/m .    General: no acute distress, cooperative with exam.  Ears: TM's and canals show no erythema, discharge, or effusion bilaterally.  Throat: moist mucous membranes, no tonsillar exudate or hypertrophy, posterior oral pharynx non-erythematous without lesions.  Neck: supple, no thyroid nodules or enlargement. Mild lymphadenopathy left anterior chain.   Lungs: clear to auscultation bilaterally, normal respiratory effort.  Heart: regular rate, normal S1 and S2, no murmurs.       ASSESSMENT / PLAN:      Viral URI  Post-nasal drip  In keeping with resolving viral URI  Reassurance given  Symptomatic management discussed  Anticipate continue improvement to full resolution over the next few days .

## 2022-09-15 ENCOUNTER — APPOINTMENT (OUTPATIENT)
Dept: URBAN - METROPOLITAN AREA CLINIC 256 | Age: 47
Setting detail: DERMATOLOGY
End: 2022-09-16

## 2022-09-15 VITALS — WEIGHT: 210 LBS | HEIGHT: 75 IN

## 2022-09-15 DIAGNOSIS — L82.0 INFLAMED SEBORRHEIC KERATOSIS: ICD-10-CM

## 2022-09-15 DIAGNOSIS — D22 MELANOCYTIC NEVI: ICD-10-CM

## 2022-09-15 DIAGNOSIS — Z71.89 OTHER SPECIFIED COUNSELING: ICD-10-CM

## 2022-09-15 DIAGNOSIS — L82.1 OTHER SEBORRHEIC KERATOSIS: ICD-10-CM

## 2022-09-15 DIAGNOSIS — L81.4 OTHER MELANIN HYPERPIGMENTATION: ICD-10-CM

## 2022-09-15 DIAGNOSIS — D18.0 HEMANGIOMA: ICD-10-CM

## 2022-09-15 PROBLEM — D18.01 HEMANGIOMA OF SKIN AND SUBCUTANEOUS TISSUE: Status: ACTIVE | Noted: 2022-09-15

## 2022-09-15 PROBLEM — D22.5 MELANOCYTIC NEVI OF TRUNK: Status: ACTIVE | Noted: 2022-09-15

## 2022-09-15 PROCEDURE — 99213 OFFICE O/P EST LOW 20 MIN: CPT | Mod: 25

## 2022-09-15 PROCEDURE — 17110 DESTRUCT B9 LESION 1-14: CPT

## 2022-09-15 PROCEDURE — OTHER MIPS QUALITY: OTHER

## 2022-09-15 PROCEDURE — OTHER LIQUID NITROGEN: OTHER

## 2022-09-15 PROCEDURE — OTHER COUNSELING: OTHER

## 2022-09-15 ASSESSMENT — LOCATION DETAILED DESCRIPTION DERM
LOCATION DETAILED: INFERIOR THORACIC SPINE
LOCATION DETAILED: LEFT INFERIOR MEDIAL UPPER BACK
LOCATION DETAILED: LEFT LATERAL UPPER BACK
LOCATION DETAILED: RIGHT SUPERIOR MEDIAL UPPER BACK

## 2022-09-15 ASSESSMENT — LOCATION SIMPLE DESCRIPTION DERM
LOCATION SIMPLE: LEFT UPPER BACK
LOCATION SIMPLE: RIGHT UPPER BACK
LOCATION SIMPLE: UPPER BACK

## 2022-09-15 ASSESSMENT — LOCATION ZONE DERM: LOCATION ZONE: TRUNK

## 2022-09-15 NOTE — PROCEDURE: LIQUID NITROGEN
Duration Of Freeze Thaw-Cycle (Seconds): 5-10
Number Of Freeze-Thaw Cycles: 3 freeze-thaw cycles
Consent: - Verbal and written consent was obtained, and risks were reviewed prior to procedure today. \\n- Risks discussed include but are not limited to pain, crusting, scabbing, blistering, scarring, temporary or permanent darker or lighter pigmentary change, recurrence, incomplete resolution, and infection.
Add 52 Modifier (Optional): no
Medical Necessity Clause: This procedure was medically necessary because the lesions that were treated were:
Spray Paint Text: The liquid nitrogen was applied to the skin utilizing a spray paint frosting technique.
Render Note In Bullet Format When Appropriate: Yes
Detail Level: Detailed
Medical Necessity Information: It is in your best interest to select a reason for this procedure from the list below. All of these items fulfill various CMS LCD requirements except the new and changing color options.
Post-Care Instructions: - Avoid picking at any of the treated lesions.

## 2022-10-15 ENCOUNTER — HEALTH MAINTENANCE LETTER (OUTPATIENT)
Age: 47
End: 2022-10-15

## 2022-12-14 DIAGNOSIS — F41.9 ANXIETY: ICD-10-CM

## 2022-12-14 NOTE — TELEPHONE ENCOUNTER
escitalopram (LEXAPRO) 10 MG    LOV 8/10/22 not related  No appt scheduled- due      PHQ 6/20/2018 8/16/2018 3/19/2020   PHQ-9 Total Score 9 1 2   Q9: Thoughts of better off dead/self-harm past 2 weeks Not at all Not at all Not at all     Left message to call back [facility]- NEEDS APPT SOON  December 14, 2022  Angelina Bains MA

## 2022-12-15 RX ORDER — ESCITALOPRAM OXALATE 10 MG/1
TABLET ORAL
Qty: 135 TABLET | Refills: 0 | Status: SHIPPED | OUTPATIENT
Start: 2022-12-15 | End: 2023-03-16

## 2023-01-03 ENCOUNTER — OFFICE VISIT (OUTPATIENT)
Dept: FAMILY MEDICINE | Facility: CLINIC | Age: 48
End: 2023-01-03

## 2023-01-03 VITALS
WEIGHT: 222.4 LBS | BODY MASS INDEX: 27.07 KG/M2 | SYSTOLIC BLOOD PRESSURE: 127 MMHG | OXYGEN SATURATION: 95 % | HEART RATE: 72 BPM | DIASTOLIC BLOOD PRESSURE: 80 MMHG

## 2023-01-03 DIAGNOSIS — R51.9 SCALP PAIN: Primary | ICD-10-CM

## 2023-01-03 PROCEDURE — 99213 OFFICE O/P EST LOW 20 MIN: CPT | Performed by: FAMILY MEDICINE

## 2023-01-03 RX ORDER — VALACYCLOVIR HYDROCHLORIDE 1 G/1
1000 TABLET, FILM COATED ORAL 3 TIMES DAILY
Qty: 21 TABLET | Refills: 0 | Status: SHIPPED | OUTPATIENT
Start: 2023-01-03 | End: 2023-02-06

## 2023-01-03 NOTE — PROGRESS NOTES
Shekhar Eldridge is a 47 year old patient who presents to clinic for evaluation.  Reports onset last night of right scalp tingling and burning extending to right ear.  No recent URI sx.  No fever or chills.  Feels well otherwise.        Review of Systems   Constitutional, HEENT, cardiovascular, pulmonary, GI, , musculoskeletal, neuro, skin, endocrine and psych systems are negative, except as otherwise noted.      Objective    /80   Pulse 72   Wt 100.9 kg (222 lb 6.4 oz)   SpO2 95%   BMI 27.07 kg/m      General: Well appearing, NAD  HEENT: scalp with one small erythematous papule, no other lesion.  Ear canal and TM clear.  Psych: normal mood and affect        Scalp pain  ? Early zoster.  Otherwise reassuring exam.  Valtrex prescription.  Close follow up by phone.  All questions answered.  precautions discussed.  - valACYclovir (VALTREX) 1000 mg tablet; Take 1 tablet (1,000 mg) by mouth 3 times daily for 7 days

## 2023-01-23 DIAGNOSIS — E78.00 HYPERCHOLESTEREMIA: ICD-10-CM

## 2023-01-23 RX ORDER — ROSUVASTATIN CALCIUM 5 MG/1
TABLET, COATED ORAL
Qty: 45 TABLET | Refills: 3 | Status: SHIPPED | OUTPATIENT
Start: 2023-01-23 | End: 2023-05-09

## 2023-02-03 NOTE — PROGRESS NOTES
3  SUBJECTIVE:   CC: Chente Cerna is an 47 year old male who presents for preventive health visit.       Patient has been advised of split billing requirements and indicates understanding: Yes  Healthy Habits:  Answers for HPI/ROS submitted by the patient on 2/6/2023  Frequency of exercise:: 1 day/week  Getting at least 3 servings of Calcium per day:: Yes  Diet:: Regular (no restrictions)  Taking medications regularly:: Yes  Medication side effects:: None  Bi-annual eye exam:: Yes  Dental care twice a year:: Yes  Sleep apnea or symptoms of sleep apnea:: None  abdominal pain: No  Blood in stool: No  Blood in urine: No  chest pain: No  chills: No  congestion: No  constipation: No  cough: No  diarrhea: No  dizziness: No  ear pain: No  eye pain: No  nervous/anxious: No  fever: No  frequency: No  genital sores: No  headaches: No  hearing loss: No  heartburn: No  arthralgias: No  joint swelling: No  peripheral edema: No  mood changes: No  myalgias: No  nausea: No  dysuria: No  palpitations: No  Skin sensation changes: No  sore throat: No  urgency: No  rash: No  shortness of breath: No  visual disturbance: No  weakness: No  impotence: No  penile discharge: No  Additional concerns today:: No  Duration of exercise:: 15-30 minutes      Today's PHQ-2 Score:   PHQ-2 ( 1999 Pf[employer]) 2/6/2023 1/3/2023   Q1: Little interest or pleasure in doing things 1 0   Q2: Feeling down, depressed or hopeless 0 0   PHQ-2 Score 1 0   PHQ-2 Total Score (12-17 Years)- Positive if 3 or more points; Administer PHQ-A if positive - -   Q1: Little interest or pleasure in doing things Several days -   Q2: Feeling down, depressed or hopeless Not at all -   PHQ-2 Score 1 -       Abuse: Current or Past(Physical, Sexual or Emotional)- No  Do you feel safe in your environment? Yes        Social History     Tobacco Use     Smoking status: Former     Packs/day: 0.10     Years: 1.00     Pack years: 0.10     Types: Cigarettes     Quit date: 1/3/1998      Years since quittin.1     Smokeless tobacco: Never     Tobacco comments:     Quit at age 21 yo, periodic use until age 26   Substance Use Topics     Alcohol use: Yes     Alcohol/week: 2.5 standard drinks     Types: 3 Cans of beer per week     Comment: 7-10     If you drink alcohol do you typically have >3 drinks per day or >7 drinks per week? Yes - AUDIT SCORE:     No flowsheet data found.                      Last PSA: No results found for: PSA  Lab work is in process  Reviewed orders with patient. 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, needed temp pacer     Positive Lyme disease serology 2020      Past Surgical History:   Procedure Laterality Date     EP TEMP PACEMAKER INSERT N/A 2020    Procedure: EP TEMP PACEMAKER INSERT;  Surgeon: Emani Lopez MD;  Location:  [facility]     GENITOURINARY SURGERY  vasectomy       ROS:  CONSTITUTIONAL: NEGATIVE for fever, chills, change in weight  INTEGUMENTARY/SKIN: NEGATIVE for worrisome rashes, moles or lesions  EYES: NEGATIVE for vision changes or irritation  ENT: NEGATIVE for ear, mouth and throat problems  RESP: NEGATIVE for significant cough or SOB  CV: NEGATIVE for chest pain, palpitations or peripheral edema  GI: NEGATIVE for nausea, abdominal pain, heartburn, or change in bowel habits   male: negative for dysuria, hematuria, decreased urinary stream, erectile dysfunction, urethral discharge  MUSCULOSKELETAL: NEGATIVE for significant arthralgias or myalgia  NEURO: NEGATIVE for weakness, dizziness or paresthesias  PSYCHIATRIC: NEGATIVE for changes in mood or affect    OBJECTIVE:   /88  "  Pulse 68   Resp 16   Ht 1.93 m (6' 4\")   Wt 97.8 kg (215 lb 9.6 oz)   SpO2 97%   BMI 26.24 kg/m    EXAM:  GENERAL: healthy, alert and no distress  EYES: Eyes grossly normal to inspection, PERRL and conjunctivae and sclerae normal  HENT: ear canals and TM's normal, nose and mouth without ulcers or lesions  NECK: no adenopathy, no asymmetry, masses, or scars and thyroid normal to palpation  RESP: lungs clear to auscultation - no rales, rhonchi or wheezes  CV: regular rate and rhythm, normal S1 S2, no S3 or S4, no murmur, click or rub, no peripheral edema and peripheral pulses strong  ABDOMEN: soft, nontender, no hepatosplenomegaly, no masses and bowel sounds normal   (male): normal male genitalia without lesions or urethral discharge, no hernia  RECTAL: normal sphincter tone, no rectal masses, prostate normal size, smooth, nontender without nodules or masses  MS: no gross musculoskeletal defects noted, no edema  SKIN: no suspicious lesions or rashes  NEURO: Normal strength and tone, mentation intact and speech normal  PSYCH: mentation appears normal, affect normal/bright    Diagnostic Test Results:  Labs reviewed in Epic    ASSESSMENT/PLAN:   Chente was seen today for physical.    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 Metabolic Panel (14) ([facility])    Screening for prostate cancer  -     PSA Serum ([facility])        Patient has been advised of split billing requirements and indicates understanding: Yes  COUNSELING:  Reviewed preventive health counseling, as reflected in patient instructions       Regular exercise       Healthy diet/nutrition    Estimated body mass index is 26.24 kg/m  as calculated from the " "following:    Height as of this encounter: 1.93 m (6' 4\").    Weight as of this encounter: 97.8 kg (215 lb 9.6 oz).        He reports that he quit smoking about 25 years ago. His smoking use included cigarettes. He has a 0.10 pack-year smoking history. He has never used smokeless tobacco.      Counseling Resources:  ATP IV Guidelines  Pooled Cohorts Equation Calculator  FRAX Risk Assessment  ICSI Preventive Guidelines  Dietary Guidelines for Americans, 2010  USDA's MyPlate  ASA Prophylaxis  Lung CA Screening    Guerrero Iqbal MD  [facility]  "

## 2023-02-03 NOTE — PATIENT INSTRUCTIONS
Preventive Health Recommendations  Male Ages 40 to 49    Yearly exam:             See your health care provider every year in order to  o   Review health changes.   o   Discuss preventive care.    o   Review your medicines if your doctor has prescribed any.  You should be tested each year for STDs (sexually transmitted diseases) if you re at risk.   Have a cholesterol test every 5 years.   Have a colonoscopy (test for colon cancer) if someone in your family has had colon cancer or polyps before age 50.   After age 45, have a diabetes test (fasting glucose). If you are at risk for diabetes, you should have this test every 3 years.    Talk with your health care provider about whether or not a prostate cancer screening test (PSA) is right for you.    Shots: Get a flu shot each year. Get a tetanus shot every 10 years.     Nutrition:  Eat at least 5 servings of fruits and vegetables daily.   Eat whole-grain bread, whole-wheat pasta and brown rice instead of white grains and rice.   Get adequate Calcium and Vitamin D.     Lifestyle  Exercise for at least 150 minutes a week (30 minutes a day, 5 days a week). This will help you control your weight and prevent disease.   Limit alcohol to one drink per day.   No smoking.   Wear sunscreen to prevent skin cancer.   See your dentist every six months for an exam and cleaning.

## 2023-02-06 ENCOUNTER — OFFICE VISIT (OUTPATIENT)
Dept: FAMILY MEDICINE | Facility: CLINIC | Age: 48
End: 2023-02-06

## 2023-02-06 VITALS
HEIGHT: 76 IN | HEART RATE: 68 BPM | DIASTOLIC BLOOD PRESSURE: 88 MMHG | BODY MASS INDEX: 26.25 KG/M2 | RESPIRATION RATE: 16 BRPM | WEIGHT: 215.6 LBS | OXYGEN SATURATION: 97 % | SYSTOLIC BLOOD PRESSURE: 128 MMHG

## 2023-02-06 DIAGNOSIS — E78.00 HYPERCHOLESTEREMIA: ICD-10-CM

## 2023-02-06 DIAGNOSIS — Z12.5 SCREENING FOR PROSTATE CANCER: ICD-10-CM

## 2023-02-06 DIAGNOSIS — Z86.79 HISTORY OF COMPLETE HEART BLOCK: ICD-10-CM

## 2023-02-06 DIAGNOSIS — F41.1 GAD (GENERALIZED ANXIETY DISORDER): ICD-10-CM

## 2023-02-06 DIAGNOSIS — Z00.00 ROUTINE GENERAL MEDICAL EXAMINATION AT A HEALTH CARE FACILITY: Primary | ICD-10-CM

## 2023-02-06 DIAGNOSIS — Z13.6 SCREENING FOR HEART DISEASE: ICD-10-CM

## 2023-02-06 PROBLEM — I44.2 COMPLETE HEART BLOCK (H): Status: RESOLVED | Noted: 2020-07-05 | Resolved: 2023-02-06

## 2023-02-06 PROBLEM — A69.29 LYME CARDITIS: Status: RESOLVED | Noted: 2020-07-22 | Resolved: 2023-02-06

## 2023-02-06 LAB
% GRANULOCYTES: 53.4 % (ref 42.2–75.2)
CHOL/HDL RATIO (RMG): 4.2 MG/DL (ref 0–4.5)
CHOLESTEROL: 247 MG/DL (ref 100–199)
HCT VFR BLD AUTO: 40.3 % (ref 39–51)
HDL (RMG): 59 MG/DL (ref 40–?)
HEMOGLOBIN: 13.7 G/DL (ref 13.4–17.5)
LDL CALCULATED (RMG): 139 MG/DL (ref 0–130)
LYMPHOCYTES NFR BLD AUTO: 37.9 % (ref 20.5–51.1)
MCH RBC QN AUTO: 29.3 PG (ref 27–31)
MCHC RBC AUTO-ENTMCNC: 34 G/DL (ref 33–37)
MCV RBC AUTO: 86 FL (ref 80–100)
MONOCYTES NFR BLD AUTO: 8.7 % (ref 1.7–9.3)
PLATELET # BLD AUTO: 235 K/UL (ref 140–450)
RBC # BLD AUTO: 4.68 X10/CMM (ref 4.2–5.9)
TRIGLYCERIDES (RMG): 244 MG/DL (ref 0–149)
WBC # BLD AUTO: 4 X10/CMM (ref 3.8–11)

## 2023-02-06 PROCEDURE — 80061 LIPID PANEL: CPT | Mod: QW | Performed by: FAMILY MEDICINE

## 2023-02-06 PROCEDURE — 85025 COMPLETE CBC W/AUTO DIFF WBC: CPT | Performed by: FAMILY MEDICINE

## 2023-02-06 PROCEDURE — 99396 PREV VISIT EST AGE 40-64: CPT | Performed by: FAMILY MEDICINE

## 2023-02-06 ASSESSMENT — ENCOUNTER SYMPTOMS
FREQUENCY: 0
DIZZINESS: 0
HEMATOCHEZIA: 0
ABDOMINAL PAIN: 0
JOINT SWELLING: 0
EYE PAIN: 0
DIARRHEA: 0
DYSURIA: 0
PARESTHESIAS: 0
MYALGIAS: 0
SHORTNESS OF BREATH: 0
NAUSEA: 0
HEMATURIA: 0
CHILLS: 0
COUGH: 0
NERVOUS/ANXIOUS: 0
HEADACHES: 0
ARTHRALGIAS: 0
CONSTIPATION: 0
WEAKNESS: 0
FEVER: 0
HEARTBURN: 0
PALPITATIONS: 0
SORE THROAT: 0

## 2023-02-07 LAB
ALBUMIN SERPL-MCNC: 4.8 G/DL (ref 4–5)
ALBUMIN/GLOB SERPL: 2 {RATIO} (ref 1.2–2.2)
ALP SERPL-CCNC: 58 IU/L (ref 44–121)
ALT SERPL-CCNC: 74 IU/L (ref 0–44)
AST SERPL-CCNC: 35 IU/L (ref 0–40)
BILIRUB SERPL-MCNC: 0.4 MG/DL (ref 0–1.2)
BUN SERPL-MCNC: 18 MG/DL (ref 6–24)
BUN/CREATININE RATIO: 21 (ref 9–20)
CALCIUM SERPL-MCNC: 9.4 MG/DL (ref 8.7–10.2)
CHLORIDE SERPLBLD-SCNC: 103 MMOL/L (ref 96–106)
CREAT SERPL-MCNC: 0.84 MG/DL (ref 0.76–1.27)
EGFR: 109 ML/MIN/1.73
GLOBULIN, TOTAL: 2.4 G/DL (ref 1.5–4.5)
GLUCOSE SERPL-MCNC: 96 MG/DL (ref 70–99)
POTASSIUM SERPL-SCNC: 4.3 MMOL/L (ref 3.5–5.2)
PROT SERPL-MCNC: 7.2 G/DL (ref 6–8.5)
PSA NG/ML: 0.4 NG/ML (ref 0–4)
SODIUM SERPL-SCNC: 139 MMOL/L (ref 134–144)
TOTAL CO2: 22 MMOL/L (ref 20–29)

## 2023-02-09 NOTE — RESULT ENCOUNTER NOTE
Dear Chente,     I am writing to report that your included test results are as expected.    Many labs contain some results that are slightly outside of the normal range, I have reviewed any of these results and they require no changes at this time.    Guerrero Iqbal MD

## 2023-03-16 DIAGNOSIS — F41.9 ANXIETY: ICD-10-CM

## 2023-03-16 RX ORDER — ESCITALOPRAM OXALATE 10 MG/1
TABLET ORAL
Qty: 135 TABLET | Refills: 0 | Status: SHIPPED | OUTPATIENT
Start: 2023-03-16 | End: 2023-06-15

## 2023-04-03 NOTE — ADDENDUM NOTE
Addended by: NEO MENDOZA on: 7/29/2020 09:06 AM     Modules accepted: Level of Service     coagulation(Bleeding disorder R/T clinical cond/anti-coags)

## 2023-05-09 ENCOUNTER — VIRTUAL VISIT (OUTPATIENT)
Dept: FAMILY MEDICINE | Facility: CLINIC | Age: 48
End: 2023-05-09

## 2023-05-09 DIAGNOSIS — U07.1 CLINICAL DIAGNOSIS OF COVID-19: ICD-10-CM

## 2023-05-09 DIAGNOSIS — R05.1 ACUTE COUGH: ICD-10-CM

## 2023-05-09 PROCEDURE — 99214 OFFICE O/P EST MOD 30 MIN: CPT | Mod: 95 | Performed by: FAMILY MEDICINE

## 2023-05-09 NOTE — PROGRESS NOTES
Problem(s) Oriented visit      Today's visit was done over a video connection to minimize patients exposure to others during the COVID-19 outbreak.    SUBJECTIVE:                                                    Chente Cerna is a 47 year old male who presents to clinic today for the following health issues :      COVID-19 Symptom Review  How many days ago did these symptoms start? Saturday tired, Sunday fever and chills and positive test for covid and added Sore throat.    Are any of the following symptoms significant for you?  New or worsening difficulty breathing? Yes    Please describe what kind of difficulty you are having breathing:Mild dyspnea (able to do ADLs without difficulty, mild shortness of breath with activities such as climbing one or two flights of stairs or walking briskly)    Worsening cough? Yes, I am coughing up mucus.    Fever or chills? Yes, I felt feverish or had chills.    Headache: No    Sore throat: YES    Chest pain: No    Diarrhea: No    Body aches? YES    What treatments has patient tried? Guaifenesin (mucinex)   Does patient live in a nursing home, group home, or shelter? No  Does patient have a way to get food/medications during quarantined? Yes, I have a friend or family member who can help me.    Problem list, Medication list, Allergies, and Medical/Social/Surgical histories reviewed in [facility] and updated as appropriate.   Additional history: as documented    ROS:  General and Resp. 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     Past Surgical History:   Procedure Laterality Date     EP TEMP PACEMAKER INSERT N/A 7/5/2020    Procedure: EP TEMP PACEMAKER INSERT;  Surgeon: Emani Lopez MD;  Location:   [facility]     GENITOURINARY SURGERY  vasectomy       Social History     Tobacco Use     Smoking status: Former     Packs/day: 0.10     Years: 1.00     Pack years: 0.10     Types: Cigarettes     Quit date: 1/3/1998     Years since quittin.3     Smokeless tobacco: Never     Tobacco comments:     Quit at age 21 yo, periodic use until age 26   Vaping Use     Vaping status: Not on file   Substance Use Topics     Alcohol use: Yes     Alcohol/week: 2.5 standard drinks of alcohol     Types: 3 Cans of beer per week     Comment: 7-10     Family History   Problem Relation Age of Onset     Anxiety Disorder Mother         Hx of Zoloft, now on Lexapro     Thyroid Disease Mother      Heart Defect Father         Replaced heart valve     Arrhythmia Father      Anxiety Disorder Brother         On Lexapro     Melanoma Brother      Neurologic Disorder Brother         Turned out to be viral, and is improved     No Known Problems Brother      Hypertension Maternal Grandfather      Asthma Paternal Grandfather      Seizure Disorder Daughter            OBJECTIVE:                                                    There were no vitals taken for this visit.  There is no height or weight on file to calculate BMI.   APPEARANCE: = Relaxed and in no distress  Lips/Teeth/Gums = No lesions seen, good dentition, and gums seem healthy  Resp effort = Calm regular breathing  Recent/Remote Memory = Alert and Oriented x 3  Mood/Affect = Cooperative and interested       ASSESSMENT/PLAN:                                                        Chente was seen today for suspected covid.    Diagnoses and all orders for this visit:    Acute cough    Clinical diagnosis of COVID-19  -     nirmatrelvir and ritonavir (PAXLOVID) 300 mg/100 mg therapy pack; Take 3 tablets by mouth 2 times daily for 5 days (Take 2 Nirmatrelvir tablets and 1 Ritonavir tablet twice daily for 5 days)        Regular exercise  Patient Instructions     COVID-19 Outpatient  Treatments  Your care team can help you find the best treatments for COVID-19. Talk to a health care provider or refer to the FDA medicine fact sheets below.    Important: You can't have Paxlovid or molnupiravir if you're starting the medicine more than 5 days after your symptoms have started.  Paxlovid: https://www.fda.gov/media/186373/download  Molnupiravir (Lagevrio): https://www.fda.gov/media/295770/download  Paxlovid (nimatrelvir and ritonavir)  How it works  Two medicines (nirmatrelvir and ritonavir) are taken together. They stop the virus from growing. Less amount of virus is easier for your body to fight.  Benefits  Lowers risk of a hospital stay or death from COVID-19.  How to take    Medicine comes in a daily container with both medicine tablets. Take by mouth twice daily (once in the morning, once at night) for 5 days.    The number of tablets to take varies by patient.    Don't chew or break capsules. Swallow whole.  When to take  Take as soon as possible after positive COVID-19 test result, and within 5 days of your first symptoms.  Who can take it  Patients must be 12 years or older, weigh at least 88 pounds, and have tested positive for COVID-19. Paxlovid is the preferred treatment for pregnant patients.  Possible side effects  Can cause altered sense of taste, diarrhea (loose, watery stools), high blood pressure, muscle aches.  Medicine conflicts    Some medicines may conflict with Paxlovid and may cause serious side effects.    Tell your care team about all the medicines you take, including prescription and over-the-counter medicines, vitamins, and herbal supplements.    Your care team will review your medicines to make sure that you can safely take Paxlovid.  Cautions    Paxlovid is not advised for patients with severe kidney or liver disease. If you have kidney or liver problems, the dose may need to be changed.    If you're pregnant or breastfeeding, talk to your care team about your  options.    If you take hormonal birth control (such as the Pill), then you or your partner should also use a non-hormonal form of birth control (such as a condom). Keep doing this for 1 menstrual cycle after your last dose of Paxlovid.  Molnupiravir (Lagevrio)  How it works  Stops the virus from growing. Less amount of virus is easier for your body to fight.  Benefits  Lowers risk of a hospital stay or death from COVID-19.  How to take  Take 4 capsules by mouth every 12 hours (4 in the morning and 4 at night) for 5 days. Don't chew or break capsules. Swallow whole.  When to take  Take as soon as possible after positive COVID-19 test result, and within 5 days of your first symptoms.  Who can take it  Patients must be 18 years or older and have tested positive for COVID-19.  Possible side effects  Diarrhea (loose, watery stools), nausea (feeling sick to your stomach), dizziness, headaches.  Medicine conflicts  Right now, there are no known conflicts with other drugs. But tell your care team about all medicines you take.  Cautions    This medicine is not advised for patients who are pregnant.    If you are someone who could become pregnant, use trusted birth control until 4 days after your last dose of molnupiravir.    If your partner could become pregnant, you should use trusted birth control until 3 months after your last dose of molnupiravir.  For informational purposes only. Not to replace the advice of your health care provider. Copyright   2022 [facility]. All rights reserved. Clinically reviewed by Sonal Hinkle, PharmD, BCACP. [employer] [id number] - REV 12/22.        The following health maintenance items are reviewed in Epic and correct as of today:  Health Maintenance   Topic Date Due     YEARLY PREVENTIVE VISIT  02/06/2024     ADVANCE CARE PLANNING  01/03/2027     LIPID  02/06/2028     DTAP/TDAP/TD IMMUNIZATION (2 - Td or Tdap) 06/20/2028     COLORECTAL CANCER SCREENING  04/26/2032     HEPATITIS  C SCREENING  Completed     HIV SCREENING  Completed     PHQ-2 (once per calendar year)  Completed     INFLUENZA VACCINE  Completed     COVID-19 Vaccine  Completed     Pneumococcal Vaccine: Pediatrics (0 to 5 Years) and At-Risk Patients (6 to 64 Years)  Aged Out     IPV IMMUNIZATION  Aged Out     MENINGITIS IMMUNIZATION  Aged Out     HEPATITIS B IMMUNIZATION  Discontinued       Guerrero Iqbal MD  [facility]  Family Practice  [facility]  [phone]    For any issues my office # is [phone]

## 2023-05-09 NOTE — PATIENT INSTRUCTIONS
COVID-19 Outpatient Treatments  Your care team can help you find the best treatments for COVID-19. Talk to a health care provider or refer to the FDA medicine fact sheets below.    Important: You can't have Paxlovid or molnupiravir if you're starting the medicine more than 5 days after your symptoms have started.  Paxlovid: https://www.fda.gov/media/101854/download  Molnupiravir (Lagevrio): https://www.fda.gov/media/051044/download  Paxlovid (nimatrelvir and ritonavir)  How it works  Two medicines (nirmatrelvir and ritonavir) are taken together. They stop the virus from growing. Less amount of virus is easier for your body to fight.  Benefits  Lowers risk of a hospital stay or death from COVID-19.  How to take    Medicine comes in a daily container with both medicine tablets. Take by mouth twice daily (once in the morning, once at night) for 5 days.    The number of tablets to take varies by patient.    Don't chew or break capsules. Swallow whole.  When to take  Take as soon as possible after positive COVID-19 test result, and within 5 days of your first symptoms.  Who can take it  Patients must be 12 years or older, weigh at least 88 pounds, and have tested positive for COVID-19. Paxlovid is the preferred treatment for pregnant patients.  Possible side effects  Can cause altered sense of taste, diarrhea (loose, watery stools), high blood pressure, muscle aches.  Medicine conflicts    Some medicines may conflict with Paxlovid and may cause serious side effects.    Tell your care team about all the medicines you take, including prescription and over-the-counter medicines, vitamins, and herbal supplements.    Your care team will review your medicines to make sure that you can safely take Paxlovid.  Cautions    Paxlovid is not advised for patients with severe kidney or liver disease. If you have kidney or liver problems, the dose may need to be changed.    If you're pregnant or breastfeeding, talk to your care team  about your options.    If you take hormonal birth control (such as the Pill), then you or your partner should also use a non-hormonal form of birth control (such as a condom). Keep doing this for 1 menstrual cycle after your last dose of Paxlovid.  Molnupiravir (Lagevrio)  How it works  Stops the virus from growing. Less amount of virus is easier for your body to fight.  Benefits  Lowers risk of a hospital stay or death from COVID-19.  How to take  Take 4 capsules by mouth every 12 hours (4 in the morning and 4 at night) for 5 days. Don't chew or break capsules. Swallow whole.  When to take  Take as soon as possible after positive COVID-19 test result, and within 5 days of your first symptoms.  Who can take it  Patients must be 18 years or older and have tested positive for COVID-19.  Possible side effects  Diarrhea (loose, watery stools), nausea (feeling sick to your stomach), dizziness, headaches.  Medicine conflicts  Right now, there are no known conflicts with other drugs. But tell your care team about all medicines you take.  Cautions    This medicine is not advised for patients who are pregnant.    If you are someone who could become pregnant, use trusted birth control until 4 days after your last dose of molnupiravir.    If your partner could become pregnant, you should use trusted birth control until 3 months after your last dose of molnupiravir.  For informational purposes only. Not to replace the advice of your health care provider. Copyright   2022 [facility]. All rights reserved. Clinically reviewed by Sonal Hinkle, PharmD, BCACP. Clean PET 086439 - REV 12/22.

## 2023-06-15 DIAGNOSIS — F41.9 ANXIETY: ICD-10-CM

## 2023-06-15 RX ORDER — ESCITALOPRAM OXALATE 10 MG/1
TABLET ORAL
Qty: 135 TABLET | Refills: 1 | Status: SHIPPED | OUTPATIENT
Start: 2023-06-15 | End: 2024-01-02

## 2023-10-19 DIAGNOSIS — E78.00 HYPERCHOLESTEREMIA: ICD-10-CM

## 2023-10-20 NOTE — TELEPHONE ENCOUNTER
"Med: Rosuvastatin 5mg    LOV (related): 2/6/23 with Dr. Iqbal     Last Lab: 2/6/23    Due for F/U around: 2/6/2024 for CPX    Next Appt: none    Cholesterol   Date Value Ref Range Status   02/06/2023 247 (A) 100 - 199 mg/dl Final   04/13/2022 226 (A) 100 - 199 mg/dl Final   01/03/2022 304 (H) 100 - 199 mg/dL Final   08/13/2020 265 (H) <200 mg/dL Final     Comment:     Desirable:       <200 mg/dl     HDL Cholesterol   Date Value Ref Range Status   01/03/2022 56 >39 mg/dL Final   08/13/2020 65 >39 mg/dL Final     HDL   Date Value Ref Range Status   02/06/2023 59 40 mg/dl Final   04/13/2022 63 40 mg/dl Final     LDL Cholesterol Calculated   Date Value Ref Range Status   01/03/2022 203 (H) 0 - 99 mg/dL Final   08/13/2020 166 (H) <100 mg/dL Final     Comment:     Above desirable:  100-129 mg/dl  Borderline High:  130-159 mg/dL  High:             160-189 mg/dL  Very high:       >189 mg/dl       LDL CALCULATED ([facility])   Date Value Ref Range Status   02/06/2023 139 (A) 0 - 130 mg/dl Final   04/13/2022 136 (A) 0 - 130 mg/dl Final     Triglycerides   Date Value Ref Range Status   02/06/2023 244 (A) 0 - 149 mg/dl Final   04/13/2022 135 0 - 149 mg/dl Final   01/03/2022 230 (H) 0 - 149 mg/dL Final   08/13/2020 169 (H) <150 mg/dL Final     Comment:     Fasting specimen  Borderline high:  150-199 mg/dl  High:             200-499 mg/dl  Very high:       >499 mg/dl       No results found for: \"CHOLHDLRATIO\"    "

## 2023-10-22 RX ORDER — ROSUVASTATIN CALCIUM 5 MG/1
TABLET, COATED ORAL
Qty: 45 TABLET | Refills: 1 | Status: SHIPPED | OUTPATIENT
Start: 2023-10-22 | End: 2024-05-30

## 2024-01-02 DIAGNOSIS — F41.9 ANXIETY: ICD-10-CM

## 2024-01-02 RX ORDER — ESCITALOPRAM OXALATE 10 MG/1
TABLET ORAL
Qty: 135 TABLET | Refills: 1 | Status: SHIPPED | OUTPATIENT
Start: 2024-01-02 | End: 2024-07-05

## 2024-03-17 ENCOUNTER — HEALTH MAINTENANCE LETTER (OUTPATIENT)
Age: 49
End: 2024-03-17

## 2024-05-30 DIAGNOSIS — E78.00 HYPERCHOLESTEREMIA: ICD-10-CM

## 2024-05-30 RX ORDER — ROSUVASTATIN CALCIUM 5 MG/1
TABLET, COATED ORAL
Qty: 45 TABLET | Refills: 1 | Status: SHIPPED | OUTPATIENT
Start: 2024-05-30

## 2024-05-30 NOTE — TELEPHONE ENCOUNTER
Medication(s) approved  KN   normal shape/no tenderness/nipples normal/no mass/no discharge or discoloration

## 2024-07-05 DIAGNOSIS — F41.9 ANXIETY: ICD-10-CM

## 2024-07-05 RX ORDER — ESCITALOPRAM OXALATE 10 MG/1
TABLET ORAL
Qty: 135 TABLET | Refills: 0 | Status: SHIPPED | OUTPATIENT
Start: 2024-07-05 | End: 2024-10-03

## 2024-07-05 NOTE — CONFIDENTIAL NOTE
Med: ESCITALOPRAM    LOV (related): 2/6/23 - CPX      Due for F/U around: 2/2024 FOR CPX - OVERDUE    Next Appt: NONE            6/20/2018     7:49 AM 8/16/2018     8:13 AM 3/19/2020    10:31 AM   PHQ   PHQ-9 Total Score 9 1 2   Q9: Thoughts of better off dead/self-harm past 2 weeks Not at all Not at all Not at all           6/20/2018     7:49 AM 8/16/2018     8:13 AM 1/3/2022     8:42 AM   LUCIAN-7 SCORE   Total Score 11 2 3

## 2024-10-03 DIAGNOSIS — F41.9 ANXIETY: ICD-10-CM

## 2024-10-03 RX ORDER — ESCITALOPRAM OXALATE 10 MG/1
TABLET ORAL
Qty: 135 TABLET | Refills: 0 | Status: SHIPPED | OUTPATIENT
Start: 2024-10-03

## 2024-11-28 DIAGNOSIS — E78.00 HYPERCHOLESTEREMIA: ICD-10-CM

## 2024-12-01 RX ORDER — ROSUVASTATIN CALCIUM 5 MG/1
TABLET, COATED ORAL
Qty: 45 TABLET | Refills: 1 | Status: SHIPPED | OUTPATIENT
Start: 2024-12-01

## 2025-03-22 ENCOUNTER — HEALTH MAINTENANCE LETTER (OUTPATIENT)
Age: 50
End: 2025-03-22

## 2025-05-30 DIAGNOSIS — E78.00 HYPERCHOLESTEREMIA: ICD-10-CM

## 2025-05-30 NOTE — TELEPHONE ENCOUNTER
"Med: Rosuvastatin    LOV (related): 2/6/23    Last Lab: 2/6/23      Due for F/U around: Over due for CPX since 2023    Next Appt: 7/31/25 for CPX        Cholesterol   Date Value Ref Range Status   02/06/2023 247 (A) 100 - 199 mg/dl Final   04/13/2022 226 (A) 100 - 199 mg/dl Final   01/03/2022 304 (H) 100 - 199 mg/dL Final   08/13/2020 265 (H) <200 mg/dL Final     Comment:     Desirable:       <200 mg/dl     HDL Cholesterol   Date Value Ref Range Status   01/03/2022 56 >39 mg/dL Final   08/13/2020 65 >39 mg/dL Final     HDL   Date Value Ref Range Status   02/06/2023 59 >=40 mg/dl Final   04/13/2022 63 >=40 mg/dl Final     LDL Cholesterol Calculated   Date Value Ref Range Status   01/03/2022 203 (H) 0 - 99 mg/dL Final   08/13/2020 166 (H) <100 mg/dL Final     Comment:     Above desirable:  100-129 mg/dl  Borderline High:  130-159 mg/dL  High:             160-189 mg/dL  Very high:       >189 mg/dl       LDL CALCULATED ([facility])   Date Value Ref Range Status   02/06/2023 139 (A) 0 - 130 mg/dl Final   04/13/2022 136 (A) 0 - 130 mg/dl Final     Triglycerides   Date Value Ref Range Status   02/06/2023 244 (A) 0 - 149 mg/dl Final   04/13/2022 135 0 - 149 mg/dl Final   01/03/2022 230 (H) 0 - 149 mg/dL Final   08/13/2020 169 (H) <150 mg/dL Final     Comment:     Fasting specimen  Borderline high:  150-199 mg/dl  High:             200-499 mg/dl  Very high:       >499 mg/dl       No results found for: \"CHOLHDLRATIO\"     "

## 2025-06-01 RX ORDER — ROSUVASTATIN CALCIUM 5 MG/1
TABLET, COATED ORAL
Qty: 45 TABLET | Refills: 1 | Status: SHIPPED | OUTPATIENT
Start: 2025-06-01

## 2025-07-03 DIAGNOSIS — F41.9 ANXIETY: ICD-10-CM

## 2025-07-03 NOTE — TELEPHONE ENCOUNTER
Med: Escitalopram    LOV (related): 02/06/2023 (last CPX)      Due for F/U around: 02/2024    Next Appt: 07/31/2025 6/20/2018     7:49 AM 8/16/2018     8:13 AM 3/19/2020    10:31 AM   PHQ   PHQ-9 Total Score 9  1  2   Q9: Thoughts of better off dead/self-harm past 2 weeks Not at all  Not at all  Not at all       Data saved with a previous flowsheet row definition           6/20/2018     7:49 AM 8/16/2018     8:13 AM 1/3/2022     8:42 AM   LUCIAN-7 SCORE   Total Score 11 2 3

## 2025-07-05 RX ORDER — ESCITALOPRAM OXALATE 10 MG/1
15 TABLET ORAL DAILY
Qty: 135 TABLET | Refills: 0 | Status: SHIPPED | OUTPATIENT
Start: 2025-07-05

## 2025-09-02 ENCOUNTER — OFFICE VISIT (OUTPATIENT)
Age: 50
End: 2025-09-02

## 2025-09-02 VITALS
DIASTOLIC BLOOD PRESSURE: 69 MMHG | OXYGEN SATURATION: 98 % | SYSTOLIC BLOOD PRESSURE: 118 MMHG | BODY MASS INDEX: 26.57 KG/M2 | HEIGHT: 77 IN | WEIGHT: 225 LBS | HEART RATE: 56 BPM

## 2025-09-02 DIAGNOSIS — F41.9 ANXIETY: ICD-10-CM

## 2025-09-02 DIAGNOSIS — E78.00 HYPERCHOLESTEREMIA: ICD-10-CM

## 2025-09-02 DIAGNOSIS — Z13.6 SCREENING FOR HEART DISEASE: ICD-10-CM

## 2025-09-02 DIAGNOSIS — Z12.5 SCREENING FOR PROSTATE CANCER: ICD-10-CM

## 2025-09-02 DIAGNOSIS — Z00.00 ROUTINE GENERAL MEDICAL EXAMINATION AT A HEALTH CARE FACILITY: Primary | ICD-10-CM

## 2025-09-02 LAB
% GRANULOCYTES: 48.5 % (ref 42.2–75.2)
ALBUMIN SERPL BCG-MCNC: 4.4 G/DL (ref 3.5–5.2)
ALP SERPL-CCNC: 66 U/L (ref 40–150)
ALT SERPL W P-5'-P-CCNC: 32 U/L (ref 0–70)
ANION GAP SERPL CALCULATED.3IONS-SCNC: 12 MMOL/L (ref 7–15)
AST SERPL W P-5'-P-CCNC: 27 U/L (ref 0–45)
BILIRUB SERPL-MCNC: 0.4 MG/DL
BUN SERPL-MCNC: 15.8 MG/DL (ref 6–20)
CALCIUM SERPL-MCNC: 9.1 MG/DL (ref 8.8–10.4)
CHLORIDE SERPL-SCNC: 105 MMOL/L (ref 98–107)
CHOLESTEROL: 220 MG/DL (ref 100–199)
CREAT SERPL-MCNC: 0.85 MG/DL (ref 0.67–1.17)
EGFRCR SERPLBLD CKD-EPI 2021: >90 ML/MIN/1.73M2
FASTING STATUS PATIENT QL REPORTED: YES
FASTING?: YES
GLUCOSE SERPL-MCNC: 103 MG/DL (ref 70–99)
HCO3 SERPL-SCNC: 22 MMOL/L (ref 22–29)
HCT VFR BLD AUTO: 42.1 % (ref 39–51)
HDL (RMG): 49 MG/DL (ref 40–?)
HEMOGLOBIN: 14.3 G/DL (ref 13.4–17.5)
LDL CALCULATED (RMG): 144 MG/DL (ref 0–130)
LYMPHOCYTES NFR BLD AUTO: 43.4 % (ref 20.5–51.1)
MCH RBC QN AUTO: 29.9 PG (ref 27–31)
MCHC RBC AUTO-ENTMCNC: 33.9 G/DL (ref 33–37)
MCV RBC AUTO: 88 FL (ref 80–100)
MONOCYTES NFR BLD AUTO: 8.1 % (ref 1.7–9.3)
PLATELET # BLD AUTO: 275 K/UL (ref 140–450)
POTASSIUM SERPL-SCNC: 4.3 MMOL/L (ref 3.4–5.3)
PROT SERPL-MCNC: 7.4 G/DL (ref 6.4–8.3)
PSA SERPL DL<=0.01 NG/ML-MCNC: 0.45 NG/ML (ref 0–2.5)
RBC # BLD AUTO: 4.78 X10/CMM (ref 4.2–5.9)
SODIUM SERPL-SCNC: 139 MMOL/L (ref 135–145)
TRIGLYCERIDES (RMG): 132 MG/DL (ref 0–149)
WBC # BLD AUTO: 3.2 X10/CMM (ref 3.8–11)

## 2025-09-02 PROCEDURE — 80053 COMPREHEN METABOLIC PANEL: CPT | Performed by: FAMILY MEDICINE

## 2025-09-02 PROCEDURE — G0103 PSA SCREENING: HCPCS | Mod: 90 | Performed by: FAMILY MEDICINE

## 2025-09-02 PROCEDURE — 99396 PREV VISIT EST AGE 40-64: CPT | Performed by: FAMILY MEDICINE

## 2025-09-02 PROCEDURE — 80061 LIPID PANEL: CPT | Mod: QW | Performed by: FAMILY MEDICINE

## 2025-09-02 PROCEDURE — 99213 OFFICE O/P EST LOW 20 MIN: CPT | Mod: 25 | Performed by: FAMILY MEDICINE

## 2025-09-02 PROCEDURE — 85025 COMPLETE CBC W/AUTO DIFF WBC: CPT | Performed by: FAMILY MEDICINE

## 2025-09-02 PROCEDURE — 36415 COLL VENOUS BLD VENIPUNCTURE: CPT | Performed by: FAMILY MEDICINE

## 2025-09-02 PROCEDURE — 3074F SYST BP LT 130 MM HG: CPT | Performed by: FAMILY MEDICINE

## 2025-09-02 PROCEDURE — 3078F DIAST BP <80 MM HG: CPT | Performed by: FAMILY MEDICINE

## 2025-09-02 RX ORDER — ESCITALOPRAM OXALATE 10 MG/1
15 TABLET ORAL DAILY
Qty: 135 TABLET | Refills: 0 | Status: SHIPPED | OUTPATIENT
Start: 2025-09-02

## 2025-09-02 RX ORDER — ROSUVASTATIN CALCIUM 5 MG/1
TABLET, COATED ORAL
Qty: 45 TABLET | Refills: 1 | Status: SHIPPED | OUTPATIENT
Start: 2025-09-02

## 2025-09-02 SDOH — HEALTH STABILITY: PHYSICAL HEALTH: ON AVERAGE, HOW MANY DAYS PER WEEK DO YOU ENGAGE IN MODERATE TO STRENUOUS EXERCISE (LIKE A BRISK WALK)?: 3 DAYS

## 2025-09-02 SDOH — HEALTH STABILITY: PHYSICAL HEALTH: ON AVERAGE, HOW MANY MINUTES DO YOU ENGAGE IN EXERCISE AT THIS LEVEL?: 60 MIN

## 2025-09-02 ASSESSMENT — ANXIETY QUESTIONNAIRES
5. BEING SO RESTLESS THAT IT IS HARD TO SIT STILL: SEVERAL DAYS
GAD7 TOTAL SCORE: 6
6. BECOMING EASILY ANNOYED OR IRRITABLE: NOT AT ALL
IF YOU CHECKED OFF ANY PROBLEMS ON THIS QUESTIONNAIRE, HOW DIFFICULT HAVE THESE PROBLEMS MADE IT FOR YOU TO DO YOUR WORK, TAKE CARE OF THINGS AT HOME, OR GET ALONG WITH OTHER PEOPLE: SOMEWHAT DIFFICULT
1. FEELING NERVOUS, ANXIOUS, OR ON EDGE: SEVERAL DAYS
GAD7 TOTAL SCORE: 6
7. FEELING AFRAID AS IF SOMETHING AWFUL MIGHT HAPPEN: SEVERAL DAYS
2. NOT BEING ABLE TO STOP OR CONTROL WORRYING: SEVERAL DAYS
3. WORRYING TOO MUCH ABOUT DIFFERENT THINGS: SEVERAL DAYS

## 2025-09-02 ASSESSMENT — PATIENT HEALTH QUESTIONNAIRE - PHQ9
5. POOR APPETITE OR OVEREATING: SEVERAL DAYS
SUM OF ALL RESPONSES TO PHQ QUESTIONS 1-9: 7

## (undated) DEVICE — INTRODUCER SHEATH FAST-CATH CATH-LOCK 8FRX12CM 406706

## (undated) DEVICE — PACK HEART RIGHT CUSTOM SAN32RHF18